# Patient Record
Sex: MALE | Race: BLACK OR AFRICAN AMERICAN | NOT HISPANIC OR LATINO | Employment: FULL TIME | URBAN - METROPOLITAN AREA
[De-identification: names, ages, dates, MRNs, and addresses within clinical notes are randomized per-mention and may not be internally consistent; named-entity substitution may affect disease eponyms.]

---

## 2020-05-20 ENCOUNTER — LAB VISIT (OUTPATIENT)
Dept: PRIMARY CARE CLINIC | Facility: CLINIC | Age: 45
End: 2020-05-20
Payer: COMMERCIAL

## 2020-05-20 DIAGNOSIS — R05.9 COUGH: Primary | ICD-10-CM

## 2020-05-20 PROCEDURE — U0003 INFECTIOUS AGENT DETECTION BY NUCLEIC ACID (DNA OR RNA); SEVERE ACUTE RESPIRATORY SYNDROME CORONAVIRUS 2 (SARS-COV-2) (CORONAVIRUS DISEASE [COVID-19]), AMPLIFIED PROBE TECHNIQUE, MAKING USE OF HIGH THROUGHPUT TECHNOLOGIES AS DESCRIBED BY CMS-2020-01-R: HCPCS

## 2020-05-21 LAB — SARS-COV-2 RNA RESP QL NAA+PROBE: NOT DETECTED

## 2020-08-03 ENCOUNTER — OFFICE VISIT (OUTPATIENT)
Dept: URGENT CARE | Facility: CLINIC | Age: 45
End: 2020-08-03
Payer: COMMERCIAL

## 2020-08-03 VITALS
HEART RATE: 91 BPM | WEIGHT: 200 LBS | RESPIRATION RATE: 18 BRPM | BODY MASS INDEX: 29.62 KG/M2 | TEMPERATURE: 99 F | SYSTOLIC BLOOD PRESSURE: 141 MMHG | DIASTOLIC BLOOD PRESSURE: 85 MMHG | HEIGHT: 69 IN | OXYGEN SATURATION: 96 %

## 2020-08-03 DIAGNOSIS — R07.89 CHEST TIGHTNESS: ICD-10-CM

## 2020-08-03 LAB — SARS-COV-2 RNA RESP QL NAA+PROBE: NOT DETECTED

## 2020-08-03 PROCEDURE — 99212 OFFICE O/P EST SF 10 MIN: CPT | Mod: S$GLB,,, | Performed by: INTERNAL MEDICINE

## 2020-08-03 PROCEDURE — U0003 INFECTIOUS AGENT DETECTION BY NUCLEIC ACID (DNA OR RNA); SEVERE ACUTE RESPIRATORY SYNDROME CORONAVIRUS 2 (SARS-COV-2) (CORONAVIRUS DISEASE [COVID-19]), AMPLIFIED PROBE TECHNIQUE, MAKING USE OF HIGH THROUGHPUT TECHNOLOGIES AS DESCRIBED BY CMS-2020-01-R: HCPCS

## 2020-08-03 PROCEDURE — 99212 PR OFFICE/OUTPT VISIT, EST, LEVL II, 10-19 MIN: ICD-10-PCS | Mod: S$GLB,,, | Performed by: INTERNAL MEDICINE

## 2020-08-03 RX ORDER — ERGOCALCIFEROL 1.25 MG/1
CAPSULE ORAL
COMMUNITY
Start: 2020-05-15 | End: 2023-02-08

## 2020-08-03 NOTE — PROGRESS NOTES
"Subjective:       Patient ID: Jason Coy is a 45 y.o. male.    Vitals:  height is 5' 9" (1.753 m) and weight is 90.7 kg (200 lb). His temperature is 98.5 °F (36.9 °C). His pulse is 91. His respiration is 18 and oxygen saturation is 96%.     Chief Complaint: Shortness of Breath (more like tightness )    Patient here today with c/o tightness in chest x's 2 days. Denies cough. Patient also stated his niece has tested Positive for COVID.      Shortness of Breath  Pertinent negatives include no chest pain, fever, headaches, leg swelling, rash, sore throat or vomiting.       Constitution: Negative for chills, fatigue and fever.   HENT: Negative for congestion and sore throat.    Neck: Negative for painful lymph nodes.   Cardiovascular: Negative for chest pain and leg swelling.   Eyes: Negative for double vision and blurred vision.   Respiratory: Positive for shortness of breath. Negative for cough.    Gastrointestinal: Negative for nausea, vomiting and diarrhea.   Genitourinary: Negative for dysuria, frequency and urgency.   Musculoskeletal: Negative for joint pain, joint swelling, muscle cramps and muscle ache.   Skin: Negative for color change, pale and rash.   Allergic/Immunologic: Negative for seasonal allergies.   Neurological: Negative for dizziness, history of vertigo, light-headedness, passing out and headaches.   Hematologic/Lymphatic: Negative for swollen lymph nodes, easy bruising/bleeding and history of blood clots. Does not bruise/bleed easily.   Psychiatric/Behavioral: Negative for nervous/anxious, sleep disturbance and depression. The patient is not nervous/anxious.        Objective:      Physical Exam   Constitutional: He is oriented to person, place, and time. He appears well-developed. He is cooperative.  Non-toxic appearance. He does not appear ill. No distress.   HENT:   Head: Normocephalic and atraumatic.   Ears:   Right Ear: Hearing, tympanic membrane, external ear and ear canal normal.   Left " Ear: Hearing, tympanic membrane, external ear and ear canal normal.   Nose: Nose normal. No mucosal edema, rhinorrhea or nasal deformity. No epistaxis. Right sinus exhibits no maxillary sinus tenderness and no frontal sinus tenderness. Left sinus exhibits no maxillary sinus tenderness and no frontal sinus tenderness.   Mouth/Throat: Uvula is midline, oropharynx is clear and moist and mucous membranes are normal. Mucous membranes are moist. No trismus in the jaw. Normal dentition. No uvula swelling. No oropharyngeal exudate, posterior oropharyngeal edema or posterior oropharyngeal erythema.   Eyes: Conjunctivae and lids are normal. No scleral icterus.   Neck: Trachea normal, full passive range of motion without pain and phonation normal. Neck supple. No neck rigidity. No edema and no erythema present.   Cardiovascular: Normal rate, regular rhythm, normal heart sounds and normal pulses.   Pulmonary/Chest: Effort normal and breath sounds normal. No stridor. No respiratory distress. He has no decreased breath sounds. He has no wheezes. He has no rhonchi. He has no rales. He exhibits no tenderness.   Abdominal: Normal appearance.   Musculoskeletal: Normal range of motion.         General: No deformity.   Neurological: He is alert and oriented to person, place, and time. He exhibits normal muscle tone. Coordination normal.   Skin: Skin is warm, dry, intact, not diaphoretic and not pale. Psychiatric: His speech is normal and behavior is normal. Judgment and thought content normal.   Nursing note and vitals reviewed.        Assessment:       1. Chest tightness        Plan:         Chest tightness  -     COVID-19 Routine Screening    Lung exam normal. Sp02 normal. Swabbed for COVID in clinic.

## 2020-08-03 NOTE — PATIENT INSTRUCTIONS
Instructions for Patients with Confirmed or Suspected COVID-19    If you are awaiting your test result, you will either be called or it will be released to the patient portal.  If you have any questions about your test, please visit [email or web address] or call our COVID-19 information line at [phone].      Instructions for non-hospitalized or discharged patients with confirmed or suspected COVID-19:       Stay home except to get medical care.    Separate yourself from other people and animals in your home.    Call ahead before visiting your doctor.    Wear a face mask.    Cover your coughs and sneezes.    Clean your hands often.    Avoid sharing personal household items.    Clean all high-touch surfaces every day.    Monitor your symptoms. Seek prompt medical attention if your illness is worsening (e.g., difficulty breathing). Before seeking care, call your healthcare provider.    If you have a medical emergency and must call 911, notify the dispatcher that you have or are being evaluated for COVID-19. If possible, put on a face mask before emergency medical services arrive.    Use the following symptom-based strategy to return to normal activity following a suspected or confirmed case of COVID-19. Continue isolation until:   o At least 3 days (72 hours) have passed since recovery defined as resolution of fever without the use of fever-reducing medications and improvement in respiratory symptoms (e.g. cough, shortness of breath), and   o At least 10 days have passed since the first positive test.       As one of the next steps, you will receive a call or text from the [place] Department of Health ([facility]) COVID-19 Tracing Team. See the contact information below so you know not to ignore the health departments call. It is important that you contact them back immediately so they can help.     Contact Tracer Number:  [phone]  Caller ID for most carriers: [place] [facility]    What is  contact tracing?   Contact tracing is a process that helps identify everyone who has been in close contact with an infected person. Contact tracers let those people know they may have been exposed and guide them on next steps. Confidentiality is important for everyone; no one will be told who may have exposed them to the virus.   Your involvement is important. The more we know about where and how this virus is spreading, the better chance we have at stopping it from spreading further.  What does exposure mean?   Exposure means you have been within 6 feet for more than 15 minutes with a person who has or had COVID-19.  What kind of questions do the contact tracers ask?   A contact tracer will confirm your basic contact information including name, address, phone number, and next of kin, as well as asking about any symptoms you may have had. Theyll also ask you how you think you may have gotten sick, such as places where you may have been exposed to the virus, and people you were with. Those names will never be shared with anyone outside of that call, and will only be used to help trace and stop the spread of the virus.   I have privacy concerns. How will the state use my information?   Your privacy about your health is important. All calls are completed using call centers that use the appropriate health privacy protection measures (HIPAA compliance), meaning that your patient information is safe. No one will ever ask you any questions related to immigration status. Your health comes first.   Do I have to participate?   You do not have to participate, but we strongly encourage you to. Contact tracing can help us catch and control new outbreaks as theyre developing to keep your friends and family safe.   What if I dont hear from anyone?   If you dont receive a call within 24 hours, you can call the number above right away to inquire about your status. That line is open from 8:00 am - 8:00 p.m., 7 days a  week.  Contact tracing saves lives! Together, we have the power to beat this virus and keep our loved ones and neighbors safe.       Instructions for household members, intimate partners and caregivers in a non-healthcare setting of a patient with confirmed or suspected COVID-19:         Close contacts should monitor their health and call their healthcare provider right away if they develop symptoms suggestive of COVID-19 (e.g., fever, cough, shortness of breath).    Stay home except to get medical care. Separate yourself from other people and animals in the home.   Monitor the patients symptoms. If the patient is getting sicker, call his or her healthcare provider. If the patient has a medical emergency and you need to call 911, notify the dispatch personnel that the patient has or is being evaluated for COVID-19.    Wear a facemask when around other people such as sharing a room or vehicle and before entering a healthcare provider's office.   Cover coughs and sneezes with a tissue. Throw used tissues in a lined trash can immediately and wash hands.   Clean hands often with soap and water for at least 20 seconds or with an alcohol-based hand , rubbing hands together until they feel dry. Avoid touching your eyes, nose, and mouth with unwashed hands.   Clean all high-touch; surfaces every day, including counters, tabletops, doorknobs, bathroom fixtures, toilets, phones, keyboards, tablets, bedside tables, etc. Use a household cleaning spray or wipe according to label instructions.   Avoid sharing personal household items such as dishes, drinking glasses, cups, towels, bedding, etc. After these items are used, they should be washed thoroughly with soap and water.   Continue isolation until:   At least 3 days (72 hours) have passed since recovery defined as resolution of fever without the use of fever-reducing medications and improvement in respiratory symptoms (e.g. cough, shortness of breath),  and    At least 10 days have passed since the patients first positive test.    https://www.cdc.gov/coronavirus/2019-ncov/your-health/index.htm

## 2021-03-31 ENCOUNTER — TELEPHONE (OUTPATIENT)
Dept: ENDOSCOPY | Facility: HOSPITAL | Age: 46
End: 2021-03-31

## 2021-04-26 ENCOUNTER — PATIENT MESSAGE (OUTPATIENT)
Dept: RESEARCH | Facility: HOSPITAL | Age: 46
End: 2021-04-26

## 2021-04-27 ENCOUNTER — TELEPHONE (OUTPATIENT)
Dept: ENDOSCOPY | Facility: HOSPITAL | Age: 46
End: 2021-04-27

## 2021-04-27 DIAGNOSIS — Z12.11 SCREEN FOR COLON CANCER: Primary | ICD-10-CM

## 2021-04-27 DIAGNOSIS — Z01.818 PRE-OP TESTING: Primary | ICD-10-CM

## 2021-04-27 RX ORDER — TRAZODONE HYDROCHLORIDE 50 MG/1
50 TABLET ORAL NIGHTLY
COMMUNITY
End: 2023-02-08

## 2021-04-27 RX ORDER — SODIUM, POTASSIUM,MAG SULFATES 17.5-3.13G
1 SOLUTION, RECONSTITUTED, ORAL ORAL DAILY
Qty: 1 KIT | Refills: 0 | Status: SHIPPED | OUTPATIENT
Start: 2021-04-27 | End: 2021-04-29

## 2021-05-23 ENCOUNTER — LAB VISIT (OUTPATIENT)
Dept: URGENT CARE | Facility: CLINIC | Age: 46
End: 2021-05-23
Payer: COMMERCIAL

## 2021-05-23 DIAGNOSIS — Z01.818 PRE-OP TESTING: ICD-10-CM

## 2021-05-23 LAB — SARS-COV-2 RNA RESP QL NAA+PROBE: NOT DETECTED

## 2021-05-23 PROCEDURE — U0003 INFECTIOUS AGENT DETECTION BY NUCLEIC ACID (DNA OR RNA); SEVERE ACUTE RESPIRATORY SYNDROME CORONAVIRUS 2 (SARS-COV-2) (CORONAVIRUS DISEASE [COVID-19]), AMPLIFIED PROBE TECHNIQUE, MAKING USE OF HIGH THROUGHPUT TECHNOLOGIES AS DESCRIBED BY CMS-2020-01-R: HCPCS | Performed by: FAMILY MEDICINE

## 2021-05-23 PROCEDURE — U0005 INFEC AGEN DETEC AMPLI PROBE: HCPCS | Performed by: FAMILY MEDICINE

## 2021-05-26 ENCOUNTER — ANESTHESIA EVENT (OUTPATIENT)
Dept: ENDOSCOPY | Facility: HOSPITAL | Age: 46
End: 2021-05-26
Payer: COMMERCIAL

## 2021-05-26 ENCOUNTER — HOSPITAL ENCOUNTER (OUTPATIENT)
Facility: HOSPITAL | Age: 46
Discharge: HOME | End: 2021-05-26
Attending: INTERNAL MEDICINE | Admitting: INTERNAL MEDICINE
Payer: COMMERCIAL

## 2021-05-26 ENCOUNTER — ANESTHESIA (OUTPATIENT)
Dept: ENDOSCOPY | Facility: HOSPITAL | Age: 46
End: 2021-05-26
Payer: COMMERCIAL

## 2021-05-26 VITALS
HEIGHT: 69 IN | HEART RATE: 60 BPM | WEIGHT: 200 LBS | BODY MASS INDEX: 29.62 KG/M2 | SYSTOLIC BLOOD PRESSURE: 118 MMHG | RESPIRATION RATE: 18 BRPM | OXYGEN SATURATION: 97 % | TEMPERATURE: 98 F | DIASTOLIC BLOOD PRESSURE: 74 MMHG

## 2021-05-26 DIAGNOSIS — Z12.11 SCREENING FOR COLON CANCER: ICD-10-CM

## 2021-05-26 PROCEDURE — E9220 PRA ENDO ANESTHESIA: ICD-10-PCS | Mod: 33,,, | Performed by: NURSE ANESTHETIST, CERTIFIED REGISTERED

## 2021-05-26 PROCEDURE — 63600175 PHARM REV CODE 636 W HCPCS: Performed by: NURSE ANESTHETIST, CERTIFIED REGISTERED

## 2021-05-26 PROCEDURE — 25000003 PHARM REV CODE 250: Performed by: INTERNAL MEDICINE

## 2021-05-26 PROCEDURE — 27201012 HC FORCEPS, HOT/COLD, DISP: Performed by: INTERNAL MEDICINE

## 2021-05-26 PROCEDURE — 88305 TISSUE EXAM BY PATHOLOGIST: ICD-10-PCS | Mod: 26,,, | Performed by: PATHOLOGY

## 2021-05-26 PROCEDURE — E9220 PRA ENDO ANESTHESIA: HCPCS | Mod: 33,,, | Performed by: NURSE ANESTHETIST, CERTIFIED REGISTERED

## 2021-05-26 PROCEDURE — 45380 PR COLONOSCOPY,BIOPSY: ICD-10-PCS | Mod: 33,,, | Performed by: INTERNAL MEDICINE

## 2021-05-26 PROCEDURE — 45380 COLONOSCOPY AND BIOPSY: CPT | Performed by: INTERNAL MEDICINE

## 2021-05-26 PROCEDURE — 37000008 HC ANESTHESIA 1ST 15 MINUTES: Performed by: INTERNAL MEDICINE

## 2021-05-26 PROCEDURE — 45380 COLONOSCOPY AND BIOPSY: CPT | Mod: 33,,, | Performed by: INTERNAL MEDICINE

## 2021-05-26 PROCEDURE — 88305 TISSUE EXAM BY PATHOLOGIST: CPT | Performed by: PATHOLOGY

## 2021-05-26 PROCEDURE — 88305 TISSUE EXAM BY PATHOLOGIST: CPT | Mod: 26,,, | Performed by: PATHOLOGY

## 2021-05-26 PROCEDURE — 37000009 HC ANESTHESIA EA ADD 15 MINS: Performed by: INTERNAL MEDICINE

## 2021-05-26 RX ORDER — PROPOFOL 10 MG/ML
VIAL (ML) INTRAVENOUS
Status: DISCONTINUED | OUTPATIENT
Start: 2021-05-26 | End: 2021-05-26

## 2021-05-26 RX ORDER — PROPOFOL 10 MG/ML
VIAL (ML) INTRAVENOUS CONTINUOUS PRN
Status: DISCONTINUED | OUTPATIENT
Start: 2021-05-26 | End: 2021-05-26

## 2021-05-26 RX ORDER — LIDOCAINE HCL/PF 100 MG/5ML
SYRINGE (ML) INTRAVENOUS
Status: DISCONTINUED | OUTPATIENT
Start: 2021-05-26 | End: 2021-05-26

## 2021-05-26 RX ORDER — SODIUM CHLORIDE 9 MG/ML
INJECTION, SOLUTION INTRAVENOUS CONTINUOUS
Status: DISCONTINUED | OUTPATIENT
Start: 2021-05-26 | End: 2021-05-26 | Stop reason: HOSPADM

## 2021-05-26 RX ADMIN — SODIUM CHLORIDE: 0.9 INJECTION, SOLUTION INTRAVENOUS at 10:05

## 2021-05-26 RX ADMIN — Medication 100 MG: at 10:05

## 2021-05-26 RX ADMIN — PROPOFOL 200 MCG/KG/MIN: 10 INJECTION, EMULSION INTRAVENOUS at 10:05

## 2021-05-26 RX ADMIN — PROPOFOL 80 MG: 10 INJECTION, EMULSION INTRAVENOUS at 10:05

## 2021-06-02 ENCOUNTER — PATIENT MESSAGE (OUTPATIENT)
Dept: GASTROENTEROLOGY | Facility: HOSPITAL | Age: 46
End: 2021-06-02

## 2021-06-02 LAB
FINAL PATHOLOGIC DIAGNOSIS: NORMAL
GROSS: NORMAL
Lab: NORMAL

## 2023-02-07 ENCOUNTER — HOSPITAL ENCOUNTER (OUTPATIENT)
Facility: HOSPITAL | Age: 48
Discharge: HOME | End: 2023-02-09
Attending: EMERGENCY MEDICINE | Admitting: INTERNAL MEDICINE
Payer: COMMERCIAL

## 2023-02-07 DIAGNOSIS — K62.5 RECTAL BLEEDING: Primary | ICD-10-CM

## 2023-02-07 DIAGNOSIS — K57.92 DIVERTICULITIS: ICD-10-CM

## 2023-02-07 DIAGNOSIS — A56.3 CHLAMYDIAL PROCTITIS: ICD-10-CM

## 2023-02-07 DIAGNOSIS — B20 HIV DISEASE: ICD-10-CM

## 2023-02-07 DIAGNOSIS — K62.89 PROCTITIS: ICD-10-CM

## 2023-02-07 LAB
ABO + RH BLD: NORMAL
ALBUMIN SERPL BCP-MCNC: 3.5 G/DL (ref 3.5–5.2)
ALP SERPL-CCNC: 73 U/L (ref 55–135)
ALT SERPL W/O P-5'-P-CCNC: 10 U/L (ref 10–44)
ANION GAP SERPL CALC-SCNC: 13 MMOL/L (ref 8–16)
AST SERPL-CCNC: 19 U/L (ref 10–40)
BASOPHILS # BLD AUTO: 0.04 K/UL (ref 0–0.2)
BASOPHILS NFR BLD: 0.6 % (ref 0–1.9)
BILIRUB SERPL-MCNC: 0.5 MG/DL (ref 0.1–1)
BILIRUB UR QL STRIP: NEGATIVE
BLD GP AB SCN CELLS X3 SERPL QL: NORMAL
BUN SERPL-MCNC: 12 MG/DL (ref 6–20)
CALCIUM SERPL-MCNC: 9.3 MG/DL (ref 8.7–10.5)
CHLORIDE SERPL-SCNC: 104 MMOL/L (ref 95–110)
CLARITY UR REFRACT.AUTO: CLEAR
CO2 SERPL-SCNC: 23 MMOL/L (ref 23–29)
COLOR UR AUTO: YELLOW
CREAT SERPL-MCNC: 1.5 MG/DL (ref 0.5–1.4)
DIFFERENTIAL METHOD: ABNORMAL
EOSINOPHIL # BLD AUTO: 0 K/UL (ref 0–0.5)
EOSINOPHIL NFR BLD: 0 % (ref 0–8)
ERYTHROCYTE [DISTWIDTH] IN BLOOD BY AUTOMATED COUNT: 14.5 % (ref 11.5–14.5)
EST. GFR  (NO RACE VARIABLE): 57.8 ML/MIN/1.73 M^2
GLUCOSE SERPL-MCNC: 89 MG/DL (ref 70–110)
GLUCOSE UR QL STRIP: NEGATIVE
HCT VFR BLD AUTO: 39.3 % (ref 40–54)
HCV AB SERPL QL IA: REACTIVE
HGB BLD-MCNC: 12.3 G/DL (ref 14–18)
HGB UR QL STRIP: ABNORMAL
IMM GRANULOCYTES # BLD AUTO: 0.01 K/UL (ref 0–0.04)
IMM GRANULOCYTES NFR BLD AUTO: 0.2 % (ref 0–0.5)
INFLUENZA A, MOLECULAR: NOT DETECTED
INFLUENZA B, MOLECULAR: NOT DETECTED
KETONES UR QL STRIP: ABNORMAL
LACTATE SERPL-SCNC: 0.6 MMOL/L (ref 0.5–2.2)
LACTATE SERPL-SCNC: 0.7 MMOL/L (ref 0.5–2.2)
LEUKOCYTE ESTERASE UR QL STRIP: NEGATIVE
LYMPHOCYTES # BLD AUTO: 3 K/UL (ref 1–4.8)
LYMPHOCYTES NFR BLD: 46.3 % (ref 18–48)
MCH RBC QN AUTO: 25.9 PG (ref 27–31)
MCHC RBC AUTO-ENTMCNC: 31.3 G/DL (ref 32–36)
MCV RBC AUTO: 83 FL (ref 82–98)
MONOCYTES # BLD AUTO: 0.6 K/UL (ref 0.3–1)
MONOCYTES NFR BLD: 9.2 % (ref 4–15)
NEUTROPHILS # BLD AUTO: 2.8 K/UL (ref 1.8–7.7)
NEUTROPHILS NFR BLD: 43.7 % (ref 38–73)
NITRITE UR QL STRIP: NEGATIVE
NRBC BLD-RTO: 0 /100 WBC
PH UR STRIP: 6 [PH] (ref 5–8)
PLATELET # BLD AUTO: 354 K/UL (ref 150–450)
PMV BLD AUTO: 11 FL (ref 9.2–12.9)
POTASSIUM SERPL-SCNC: 4 MMOL/L (ref 3.5–5.1)
PROT SERPL-MCNC: 9 G/DL (ref 6–8.4)
PROT UR QL STRIP: ABNORMAL
RBC # BLD AUTO: 4.74 M/UL (ref 4.6–6.2)
RSV AG BY MOLECULAR METHOD: NOT DETECTED
SARS-COV-2 RNA RESP QL NAA+PROBE: NOT DETECTED
SODIUM SERPL-SCNC: 140 MMOL/L (ref 136–145)
SP GR UR STRIP: >1.03 (ref 1–1.03)
URN SPEC COLLECT METH UR: ABNORMAL
WBC # BLD AUTO: 6.41 K/UL (ref 3.9–12.7)

## 2023-02-07 PROCEDURE — 25000003 PHARM REV CODE 250: Performed by: EMERGENCY MEDICINE

## 2023-02-07 PROCEDURE — G0378 HOSPITAL OBSERVATION PER HR: HCPCS

## 2023-02-07 PROCEDURE — 87491 CHLMYD TRACH DNA AMP PROBE: CPT | Performed by: EMERGENCY MEDICINE

## 2023-02-07 PROCEDURE — 99285 EMERGENCY DEPT VISIT HI MDM: CPT | Mod: CS,,, | Performed by: EMERGENCY MEDICINE

## 2023-02-07 PROCEDURE — 25000003 PHARM REV CODE 250: Performed by: PHYSICIAN ASSISTANT

## 2023-02-07 PROCEDURE — 80053 COMPREHEN METABOLIC PANEL: CPT | Performed by: EMERGENCY MEDICINE

## 2023-02-07 PROCEDURE — 99285 EMERGENCY DEPT VISIT HI MDM: CPT | Mod: 25

## 2023-02-07 PROCEDURE — 96365 THER/PROPH/DIAG IV INF INIT: CPT | Mod: 59

## 2023-02-07 PROCEDURE — 86803 HEPATITIS C AB TEST: CPT | Performed by: PHYSICIAN ASSISTANT

## 2023-02-07 PROCEDURE — 0241U SARS-COV2 (COVID) WITH FLU/RSV BY PCR: CPT | Performed by: EMERGENCY MEDICINE

## 2023-02-07 PROCEDURE — 81003 URINALYSIS AUTO W/O SCOPE: CPT | Performed by: PHYSICIAN ASSISTANT

## 2023-02-07 PROCEDURE — 85025 COMPLETE CBC W/AUTO DIFF WBC: CPT | Performed by: EMERGENCY MEDICINE

## 2023-02-07 PROCEDURE — 96360 HYDRATION IV INFUSION INIT: CPT | Mod: 59

## 2023-02-07 PROCEDURE — 99285 PR EMERGENCY DEPT VISIT,LEVEL V: ICD-10-PCS | Mod: CS,,, | Performed by: EMERGENCY MEDICINE

## 2023-02-07 PROCEDURE — 63600175 PHARM REV CODE 636 W HCPCS: Performed by: EMERGENCY MEDICINE

## 2023-02-07 PROCEDURE — 87522 HEPATITIS C REVRS TRNSCRPJ: CPT | Performed by: EMERGENCY MEDICINE

## 2023-02-07 PROCEDURE — 96361 HYDRATE IV INFUSION ADD-ON: CPT

## 2023-02-07 PROCEDURE — 87040 BLOOD CULTURE FOR BACTERIA: CPT | Performed by: EMERGENCY MEDICINE

## 2023-02-07 PROCEDURE — 96375 TX/PRO/DX INJ NEW DRUG ADDON: CPT | Mod: 59

## 2023-02-07 PROCEDURE — 83605 ASSAY OF LACTIC ACID: CPT | Performed by: EMERGENCY MEDICINE

## 2023-02-07 PROCEDURE — 86900 BLOOD TYPING SEROLOGIC ABO: CPT | Performed by: EMERGENCY MEDICINE

## 2023-02-07 PROCEDURE — 25500020 PHARM REV CODE 255: Performed by: EMERGENCY MEDICINE

## 2023-02-07 RX ORDER — ACETAMINOPHEN 325 MG/1
650 TABLET ORAL
Status: COMPLETED | OUTPATIENT
Start: 2023-02-07 | End: 2023-02-07

## 2023-02-07 RX ORDER — PROCHLORPERAZINE EDISYLATE 5 MG/ML
5 INJECTION INTRAMUSCULAR; INTRAVENOUS EVERY 6 HOURS PRN
Status: DISCONTINUED | OUTPATIENT
Start: 2023-02-08 | End: 2023-02-09 | Stop reason: HOSPADM

## 2023-02-07 RX ORDER — NALOXONE HCL 0.4 MG/ML
0.02 VIAL (ML) INJECTION
Status: DISCONTINUED | OUTPATIENT
Start: 2023-02-08 | End: 2023-02-09 | Stop reason: HOSPADM

## 2023-02-07 RX ORDER — ONDANSETRON 2 MG/ML
4 INJECTION INTRAMUSCULAR; INTRAVENOUS EVERY 8 HOURS PRN
Status: DISCONTINUED | OUTPATIENT
Start: 2023-02-08 | End: 2023-02-09 | Stop reason: HOSPADM

## 2023-02-07 RX ORDER — MORPHINE SULFATE 4 MG/ML
4 INJECTION, SOLUTION INTRAMUSCULAR; INTRAVENOUS
Status: COMPLETED | OUTPATIENT
Start: 2023-02-07 | End: 2023-02-07

## 2023-02-07 RX ORDER — IBUPROFEN 200 MG
16 TABLET ORAL
Status: DISCONTINUED | OUTPATIENT
Start: 2023-02-08 | End: 2023-02-09 | Stop reason: HOSPADM

## 2023-02-07 RX ORDER — IPRATROPIUM BROMIDE AND ALBUTEROL SULFATE 2.5; .5 MG/3ML; MG/3ML
3 SOLUTION RESPIRATORY (INHALATION) EVERY 6 HOURS PRN
Status: DISCONTINUED | OUTPATIENT
Start: 2023-02-08 | End: 2023-02-09 | Stop reason: HOSPADM

## 2023-02-07 RX ORDER — SODIUM CHLORIDE 9 MG/ML
1000 INJECTION, SOLUTION INTRAVENOUS
Status: COMPLETED | OUTPATIENT
Start: 2023-02-07 | End: 2023-02-07

## 2023-02-07 RX ORDER — IBUPROFEN 200 MG
24 TABLET ORAL
Status: DISCONTINUED | OUTPATIENT
Start: 2023-02-08 | End: 2023-02-09 | Stop reason: HOSPADM

## 2023-02-07 RX ORDER — SODIUM CHLORIDE 9 MG/ML
INJECTION, SOLUTION INTRAVENOUS CONTINUOUS
Status: ACTIVE | OUTPATIENT
Start: 2023-02-08 | End: 2023-02-09

## 2023-02-07 RX ORDER — SODIUM CHLORIDE 0.9 % (FLUSH) 0.9 %
10 SYRINGE (ML) INJECTION
Status: DISCONTINUED | OUTPATIENT
Start: 2023-02-08 | End: 2023-02-09 | Stop reason: HOSPADM

## 2023-02-07 RX ORDER — ACETAMINOPHEN 325 MG/1
650 TABLET ORAL EVERY 4 HOURS PRN
Status: DISCONTINUED | OUTPATIENT
Start: 2023-02-08 | End: 2023-02-09 | Stop reason: HOSPADM

## 2023-02-07 RX ORDER — TALC
6 POWDER (GRAM) TOPICAL NIGHTLY PRN
Status: DISCONTINUED | OUTPATIENT
Start: 2023-02-08 | End: 2023-02-09 | Stop reason: HOSPADM

## 2023-02-07 RX ADMIN — SODIUM CHLORIDE, POTASSIUM CHLORIDE, SODIUM LACTATE AND CALCIUM CHLORIDE 1000 ML: 600; 310; 30; 20 INJECTION, SOLUTION INTRAVENOUS at 05:02

## 2023-02-07 RX ADMIN — MORPHINE SULFATE 4 MG: 4 INJECTION INTRAVENOUS at 09:02

## 2023-02-07 RX ADMIN — PIPERACILLIN SODIUM AND TAZOBACTAM SODIUM 4.5 G: 4; .5 INJECTION, POWDER, FOR SOLUTION INTRAVENOUS at 09:02

## 2023-02-07 RX ADMIN — SODIUM CHLORIDE 1000 ML: 0.9 INJECTION, SOLUTION INTRAVENOUS at 03:02

## 2023-02-07 RX ADMIN — IOHEXOL 100 ML: 350 INJECTION, SOLUTION INTRAVENOUS at 08:02

## 2023-02-07 RX ADMIN — ACETAMINOPHEN 650 MG: 325 TABLET ORAL at 03:02

## 2023-02-07 NOTE — ED TRIAGE NOTES
"Pt arriving to ED with c/o blood in stool.Passing bright red blood for 2 weeks, started with blood in urine. States no longer passing blood in urine. Also states hasn't had a full meal in two weeks because "every time I eat, I feel like I have to go to the bathroom and nothing comes out so it is just painful". States abdominal discomfort, but no pain. C/o pain in rectum.   "

## 2023-02-07 NOTE — FIRST PROVIDER EVALUATION
" Emergency Department TeleTriage Encounter Note      CHIEF COMPLAINT    Chief Complaint   Patient presents with    Rectal Bleeding     Passing bright red blood for 2 weeks, started with blood in urine       VITAL SIGNS   Initial Vitals [02/07/23 1429]   BP Pulse Resp Temp SpO2   125/76 104 18 (!) 100.4 °F (38 °C) 98 %      MAP       --            ALLERGIES    Review of patient's allergies indicates:   Allergen Reactions    Sulfamethoxazole-trimethoprim Other (See Comments)     "Headaches and sickness"  "Headaches and sickness"         PROVIDER TRIAGE NOTE  Patient presents with severe rectal pain, lower abdominal pain, bright red blood per rectum x2 weeks. Also reports hematuria 2 weeks ago for 2 days but that resolved.       ORDERS  Labs Reviewed   HEPATITIS C ANTIBODY   CBC W/ AUTO DIFFERENTIAL   COMPREHENSIVE METABOLIC PANEL   TYPE & SCREEN       ED Orders (720h ago, onward)      Start Ordered     Status Ordering Provider    02/07/23 1512 02/07/23 1511  Saline lock IV (18 ga. or larger)  Once         Ordered ERIKA DEUTSCH    02/07/23 1512 02/07/23 1511  2nd Saline lock IV (18 ga. or larger)  Once         Ordered ERIKA DEUTSCH    02/07/23 1512 02/07/23 1511  NPO (Ice Chips)  Once         Ordered ERIKA DEUTSCH    02/07/23 1512 02/07/23 1511  CBC auto differential  STAT         Ordered ERIKA DEUTSCH    02/07/23 1512 02/07/23 1511  Comprehensive metabolic panel  STAT         Ordered ERIKA DEUTSCH    02/07/23 1512 02/07/23 1511  Type & Screen  STAT         Ordered ERIKA DEUTSCH    02/07/23 1512 02/07/23 1511  Vital signs  Once         Ordered ERIKA DEUTSCH    02/07/23 1512 02/07/23 1511  Cardiac Monitoring - Adult  Continuous        Comments: Notify Physician If:    Ordered ERIKA DEUTSCH    02/07/23 1512 02/07/23 1511  Pulse Oximetry Continuous  Continuous         Ordered ERIKA DEUTSCH    02/07/23 1431 02/07/23 1431  Hepatitis C Antibody  STAT         Ordered " CECIL CARBAJAL              Virtual Visit Note: The provider triage portion of this emergency department evaluation and documentation was performed via [employer], a HIPAA-compliant telemedicine application, in concert with a tele-presenter in the room. A face to face patient evaluation with one of my colleagues will occur once the patient is placed in an emergency department room.      DISCLAIMER: This note was prepared with [employer] voice recognition transcription software. Garbled syntax, mangled pronouns, and other bizarre constructions may be attributed to that software system.

## 2023-02-07 NOTE — ED PROVIDER NOTES
"Encounter Date: 2/7/2023       History     Chief Complaint   Patient presents with    Rectal Bleeding     Passing bright red blood for 2 weeks, started with blood in urine     48 yo male presenting with rectal bleeding and pain.    PMH: HIV on AART, paresthesias, syphilis   No anticoagulation     Context:  The patient reports symptoms for 2 weeks.  He reports rectal discomfort and is having difficulty passing stool.  He has had multiple episodes of bright red blood (has pictures on his phone).  He went to his primary care doctor and was recently tested for syphilis, GC/CT last week, results were all negative today and he was instructed to present to the ED.  He reports some lower abdominal discomfort and has had difficulty eating due to pain.  He had some hematuria when his symptoms began a week ago  Onset:  Gradual   Location:  Left lower quadrant   Duration:  2 weeks   Associated Symptoms: denies vomiting         The history is provided by the patient and medical records. No  was used.   Review of patient's allergies indicates:   Allergen Reactions    Sulfamethoxazole-trimethoprim Other (See Comments)     "Headaches and sickness"  "Headaches and sickness"       Past Medical History:   Diagnosis Date    HIV infection      Past Surgical History:   Procedure Laterality Date    COLONOSCOPY N/A 5/26/2021    Procedure: COLONOSCOPY;  Surgeon: Kaleb Magdaleno MD;  Location: [address]);  Service: Endoscopy;  Laterality: N/A;  outside referral from [facility] care for Colonoscopy see media tab records -   cOVID test on 5/23/21 at [facility] -     JOINT REPLACEMENT      ankle surgery(L)     History reviewed. No pertinent family history.  Social History     Tobacco Use    Smoking status: Never    Smokeless tobacco: Never   Substance Use Topics    Alcohol use: Yes     Comment: socially     Drug use: Never     Review of Systems   Constitutional:  Positive for fever.   Gastrointestinal:  " Positive for abdominal pain and anal bleeding. Negative for vomiting.   Genitourinary:  Negative for dysuria and testicular pain.     Physical Exam     Initial Vitals [02/07/23 1429]   BP Pulse Resp Temp SpO2   125/76 104 18 (!) 100.4 °F (38 °C) 98 %      MAP       --         Physical Exam    Nursing note and vitals reviewed.  Constitutional: He is not diaphoretic. No distress.   HENT:   Head: Normocephalic and atraumatic.   Eyes: Right eye exhibits no discharge. Left eye exhibits no discharge.   Neck: Neck supple. No tracheal deviation present.   Cardiovascular:  Normal rate and regular rhythm.           Abdominal: Abdomen is soft. Bowel sounds are normal. He exhibits no distension. There is abdominal tenderness (LLQ).   Genitourinary: Rectum:      External hemorrhoid present.      No anal fissure, tenderness or internal hemorrhoid.      Genitourinary Comments: Pt's RN Chaperone present at bedside      Musculoskeletal:      Cervical back: Neck supple.     Lymphadenopathy: No inguinal adenopathy noted on the right or left side.   Neurological: He is alert and oriented to person, place, and time.   Skin: Skin is warm. No rash noted.   Psychiatric: He has a normal mood and affect. His behavior is normal.       ED Course   Procedures  Labs Reviewed   HEPATITIS C ANTIBODY - Abnormal; Notable for the following components:       Result Value    Hepatitis C Ab Reactive (*)     All other components within normal limits    Narrative:     Release to patient->Immediate   CBC W/ AUTO DIFFERENTIAL - Abnormal; Notable for the following components:    Hemoglobin 12.3 (*)     Hematocrit 39.3 (*)     MCH 25.9 (*)     MCHC 31.3 (*)     All other components within normal limits   COMPREHENSIVE METABOLIC PANEL - Abnormal; Notable for the following components:    Creatinine 1.5 (*)     Total Protein 9.0 (*)     eGFR 57.8 (*)     All other components within normal limits   URINALYSIS, REFLEX TO URINE CULTURE - Abnormal; Notable for the  following components:    Specific Gravity, UA >1.030 (*)     Protein, UA Trace (*)     Ketones, UA 1+ (*)     Occult Blood UA Trace (*)     All other components within normal limits    Narrative:     Specimen Source->Urine   COMPREHENSIVE METABOLIC PANEL - Abnormal; Notable for the following components:    CO2 20 (*)     Calcium 8.5 (*)     Albumin 2.8 (*)     ALT 8 (*)     All other components within normal limits    Narrative:     Release to patient->Immediate   CBC WITHOUT DIFFERENTIAL - Abnormal; Notable for the following components:    RBC 4.06 (*)     Hemoglobin 10.4 (*)     Hematocrit 33.8 (*)     MCH 25.6 (*)     MCHC 30.8 (*)     RDW 14.6 (*)     All other components within normal limits    Narrative:     Release to patient->Immediate   CULTURE, BLOOD    Narrative:     Aerobic and anaerobic   CULTURE, BLOOD    Narrative:     Aerobic and anaerobic   CULTURE, STOOL   T-HELPER CELLS (CD4) COUNT   LACTIC ACID, PLASMA   LACTIC ACID, PLASMA   SARS-COV2 (COVID) WITH FLU/RSV BY PCR   C.TRACH/N.GONOR AMP RNA, VARIES   CK   T-HELPER CELLS (CD4) COUNT   HEPATITIS C RNA, QUANTITATIVE, PCR   HELPER-SUPPRESSOR RATIO   RPR   CK   TYPE & SCREEN          Imaging Results               CTA Acute GI Isabel, Abdomen and Pelvis (Final result)  Result time 02/07/23 20:46:57      Final result by Erick Mendoza MD (02/07/23 20:46:57)                   Impression:      1. Circumferential wall thickening of the rectum with mild adjacent inflammatory stranding.  The findings suggest proctitis.  Probable mild acute diverticulitis of the distal sigmoid colon also.  Underlying neoplasm of the rectum is not completely excluded.  See above comments.  Recommend follow-up.  2. Incomplete distension of the urinary bladder with possible mild wall thickening.  Cystitis is a consideration.  3. Multiple small air-filled cystic foci at the lung bases, possibly associated with emphysematous changes or mild lymphangiomyomatosis.  4. Bilateral renal  cysts.  5. Constipation.  6.  This report was flagged in Epic as abnormal.      Electronically signed by: Erick Bran  Date:    02/07/2023  Time:    20:46               Narrative:    EXAMINATION:  CTA ACUTE GI BLEED, ABDOMEN AND PELVIS    CLINICAL HISTORY:  Multiple episodes of BRBPR, tenesmus, LLQ pain;    TECHNIQUE:  Low dose axial images, sagittal and coronal reformations were obtained from the lung bases to the pubic symphysis with and without the IV administration of 100 mL of Omnipaque 350 .  CTA protocol.  Precontrast, arterial, portal venous phases are submitted.    COMPARISON:  None.    FINDINGS:  Abdomen:    - Lower thorax:    - Lung bases: No infiltrates and no nodules.  Multiple small air-filled cystic foci are seen at the lung bases possibly associated with emphysematous changes or mild lymphangioleiomyomatosis    - Liver: No focal mass.    - Gallbladder: No calcified gallstones.    - Bile Ducts: No evidence of intra or extra hepatic biliary ductal dilation.    - Spleen: Negative.    - Kidneys: Few bilateral renal cysts.  No stone, soft tissue mass or hydronephrosis bilaterally.    - Adrenals: Unremarkable.    - Pancreas: No mass or peripancreatic fat stranding.    - Retroperitoneum:  No significant adenopathy.    - Vascular: No abdominal aortic aneurysm.    - Abdominal wall:  Unremarkable.    The appendix is within normal limits.    Pelvis:    Urinary bladder is incompletely distended with possible mild wall thickening.    Bowel/Mesentery:    There is circumferential wall thickening noted to the rectum.  There is mild adjacent inflammatory stranding.  Findings suggest proctitis.  Underlying neoplasm of the rectum is not excluded.  Recommend follow-up to resolution.  Endoscopy may be helpful also.    Possible minimal acute diverticulitis of the sigmoid colon also.    Retained feces throughout most of the colon most prominent in the proximal colon.    Bones:  No acute osseous abnormality and no  suspicious lytic or blastic lesion.    Soft tissue calcification adjacent to the left ischium may be associated with prior adjacent hamstring injury.    No evidence of acute contrast extravasation in the GI tract.                                       Medications   sodium chloride 0.9% flush 10 mL (has no administration in time range)   albuterol-ipratropium 2.5 mg-0.5 mg/3 mL nebulizer solution 3 mL (has no administration in time range)   melatonin tablet 6 mg (has no administration in time range)   ondansetron injection 4 mg (has no administration in time range)   prochlorperazine injection Soln 5 mg (has no administration in time range)   acetaminophen tablet 650 mg (has no administration in time range)   naloxone 0.4 mg/mL injection 0.02 mg (has no administration in time range)   glucose chewable tablet 16 g (has no administration in time range)   glucose chewable tablet 24 g (has no administration in time range)   0.9%  NaCl infusion ( Intravenous New Bag 2/8/23 0059)   oxyCODONE immediate release tablet 5 mg (5 mg Oral Given 2/8/23 0639)   doxycycline tablet 100 mg (100 mg Oral Given 2/8/23 0809)   mrbtegahp-mvuuwdvu-nmifiwh ala -25 mg (25 kg or greater) 1 tablet (has no administration in time range)   acetaminophen tablet 650 mg (650 mg Oral Given 2/7/23 1529)   0.9%  NaCl infusion (0 mLs Intravenous Stopped 2/7/23 1641)   lactated ringers bolus 1,000 mL (0 mLs Intravenous Stopped 2/7/23 1850)   iohexoL (OMNIPAQUE 350) injection 100 mL (100 mLs Intravenous Given 2/7/23 2000)   morphine injection 4 mg (4 mg Intravenous Given 2/7/23 2107)   piperacillin-tazobactam (ZOSYN) 4.5 g in dextrose 5 % in water (D5W) 5 % 100 mL IVPB (MB+) (0 g Intravenous Stopped 2/7/23 2139)   cefTRIAXone injection 0.5 g (0.5 g Intramuscular Given 2/8/23 0102)   LIDOcaine HCL 10 mg/ml (1%) injection 2.1 mL (2.1 mLs Intramuscular Given 2/8/23 0102)     Medical Decision Making:   History:   Old Medical Records: I decided to obtain  "old medical records.  Old Records Summarized: other records.       <> Summary of Records: Recent clinic results not visible on record review    Prior colonoscopy:  - Perianal skin tags found on perianal exam.                          - The distal rectum and anal verge are normal on                          retroflexion view.                          - One 2 mm polyp in the sigmoid colon, removed                          with a jumbo cold forceps. Resected and retrieved.   Initial Assessment:   Emergent evaluation of a 47-year-old male presenting with bright red blood per rectum, rectal pain, abdominal pain.  Arrives febrile and tachycardic, meeting 2 SIRS criteria. Non-peritonitic abdomen.   Reviewed orders by tele triage provider.  Differential Diagnosis:   Including, but not limited to:    Internal hemorrhoids  Proctitis  Lymphogranuloma venereum  Diverticulitis   Anemia   Clinical Tests:   Lab Tests: Ordered and Reviewed       <> Summary of Lab: No leukocytosis  Stable anemia    Radiological Study: Ordered and Reviewed  Sepsis Perfusion Assessment: "I attest a sepsis perfusion exam was performed within 6 hours of sepsis, severe sepsis, or septic shock presentation, following fluid resuscitation."  ED Management:  CTA with likely proctitis, possible mild diverticulitis, no active GI bleed.  Discussed retesting for GC/CT via rectal swab with patient - he is amenable, but will hold on empiric treatment given recent neg testing.  Case discussed with CRS -- recommend GI consult in the morning and consult placed.  No lactate elevation or hypotension to suggest septic shock or warrant 30 cc/kilo bolus.  Case discussed with Hospital Medicine - plan for GI consult, trend hemoglobin, continue IV abx, f/u rectal swab, HM to add on CPK level.  Patient understands and agrees with the plan for admission.    ED Diagnoses:  GI bleeding  Acute diverticulitis  Acute proctitis    Other:   I have discussed this case with another " health care provider.           ED Course as of 02/08/23 0838   Tue Feb 07, 2023   1647 WBC: 6.41  No leukocytosis  [AB]   1647 Hemoglobin(!): 12.3  Stable anemia  [AB]   1647 Creatinine(!): 1.5  Most recent 1.1 [AB]   1648 Temp(!): 100.4 °F (38 °C)  Fever  [AB]   1648 Pulse: 104  Tachycardia  [AB]   1856 Lactate, Ethan: 0.7  Normal lactate  [AB]   1943 SARS-CoV2 (COVID-19) Qualitative PCR: Not Detected  Viral swabs negative  [AB]   2053 Leukocytes, UA: Negative [AB]   2053 NITRITE UA: Negative  No UTI  [AB]      ED Course User Index  [AB] Ulisses De Luna MD                   Clinical Impression:   Final diagnoses:  [K62.5] Rectal bleeding (Primary)  [K62.89] Proctitis  [K57.92] Diverticulitis        ED Disposition Condition    Observation Stable                Ulisses De Luna MD  02/08/23 0838

## 2023-02-08 PROBLEM — B20 HIV DISEASE: Status: ACTIVE | Noted: 2023-02-08

## 2023-02-08 PROBLEM — A53.9 SYPHILIS: Status: ACTIVE | Noted: 2023-02-08

## 2023-02-08 PROBLEM — N17.9 AKI (ACUTE KIDNEY INJURY): Status: ACTIVE | Noted: 2023-02-08

## 2023-02-08 PROBLEM — K62.89 PROCTITIS: Status: ACTIVE | Noted: 2023-02-08

## 2023-02-08 PROBLEM — B20 HIV DISEASE: Chronic | Status: ACTIVE | Noted: 2023-02-08

## 2023-02-08 PROBLEM — K57.92 DIVERTICULITIS: Status: ACTIVE | Noted: 2023-02-08

## 2023-02-08 PROBLEM — A56.3 CHLAMYDIAL PROCTITIS: Status: ACTIVE | Noted: 2023-02-08

## 2023-02-08 LAB
ABSOLUTE CD3: 2041 CELLS/UL (ref 700–2100)
ABSOLUTE CD8: 1433 CELLS/UL (ref 200–900)
ALBUMIN SERPL BCP-MCNC: 2.8 G/DL (ref 3.5–5.2)
ALP SERPL-CCNC: 59 U/L (ref 55–135)
ALT SERPL W/O P-5'-P-CCNC: 8 U/L (ref 10–44)
ANION GAP SERPL CALC-SCNC: 10 MMOL/L (ref 8–16)
AST SERPL-CCNC: 15 U/L (ref 10–40)
BILIRUB SERPL-MCNC: 0.5 MG/DL (ref 0.1–1)
BUN SERPL-MCNC: 10 MG/DL (ref 6–20)
CALCIUM SERPL-MCNC: 8.5 MG/DL (ref 8.7–10.5)
CD3%: 87.2 % (ref 55–83)
CD3+CD4+ CELLS # BLD: 553 CELLS/UL (ref 300–1400)
CD3+CD4+ CELLS NFR BLD: 23.6 % (ref 28–57)
CD4/CD8 RATIO: 0.39 (ref 0.9–3.6)
CD8 % SUPPRESSOR T CELL: 61.2 % (ref 10–39)
CHLORIDE SERPL-SCNC: 106 MMOL/L (ref 95–110)
CK SERPL-CCNC: 240 U/L (ref 20–200)
CO2 SERPL-SCNC: 20 MMOL/L (ref 23–29)
CREAT SERPL-MCNC: 1.1 MG/DL (ref 0.5–1.4)
ERYTHROCYTE [DISTWIDTH] IN BLOOD BY AUTOMATED COUNT: 14.6 % (ref 11.5–14.5)
EST. GFR  (NO RACE VARIABLE): >60 ML/MIN/1.73 M^2
GLUCOSE SERPL-MCNC: 100 MG/DL (ref 70–110)
HCT VFR BLD AUTO: 33.8 % (ref 40–54)
HCV RNA SERPL QL NAA+PROBE: NOT DETECTED
HCV RNA SPEC NAA+PROBE-ACNC: NOT DETECTED IU/ML
HGB BLD-MCNC: 10.4 G/DL (ref 14–18)
HGB BLD-MCNC: 11.6 G/DL (ref 14–18)
MCH RBC QN AUTO: 25.6 PG (ref 27–31)
MCHC RBC AUTO-ENTMCNC: 30.8 G/DL (ref 32–36)
MCV RBC AUTO: 83 FL (ref 82–98)
PLATELET # BLD AUTO: 269 K/UL (ref 150–450)
PMV BLD AUTO: 10.4 FL (ref 9.2–12.9)
POTASSIUM SERPL-SCNC: 4.1 MMOL/L (ref 3.5–5.1)
PROT SERPL-MCNC: 7.4 G/DL (ref 6–8.4)
RBC # BLD AUTO: 4.06 M/UL (ref 4.6–6.2)
RPR SER QL: REACTIVE
RPR SER-TITR: ABNORMAL {TITER}
SODIUM SERPL-SCNC: 136 MMOL/L (ref 136–145)
WBC # BLD AUTO: 4.34 K/UL (ref 3.9–12.7)

## 2023-02-08 PROCEDURE — 25000003 PHARM REV CODE 250: Performed by: INTERNAL MEDICINE

## 2023-02-08 PROCEDURE — 99222 PR INITIAL HOSPITAL CARE,LEVL II: ICD-10-PCS | Mod: ,,, | Performed by: HOSPITALIST

## 2023-02-08 PROCEDURE — 99204 OFFICE O/P NEW MOD 45 MIN: CPT | Mod: ,,, | Performed by: INTERNAL MEDICINE

## 2023-02-08 PROCEDURE — 85027 COMPLETE CBC AUTOMATED: CPT | Performed by: HOSPITALIST

## 2023-02-08 PROCEDURE — 99214 PR OFFICE/OUTPT VISIT, EST, LEVL IV, 30-39 MIN: ICD-10-PCS | Mod: ,,, | Performed by: INTERNAL MEDICINE

## 2023-02-08 PROCEDURE — G0378 HOSPITAL OBSERVATION PER HR: HCPCS

## 2023-02-08 PROCEDURE — 63600175 PHARM REV CODE 636 W HCPCS: Performed by: INTERNAL MEDICINE

## 2023-02-08 PROCEDURE — 99204 PR OFFICE/OUTPT VISIT, NEW, LEVL IV, 45-59 MIN: ICD-10-PCS | Mod: ,,, | Performed by: INTERNAL MEDICINE

## 2023-02-08 PROCEDURE — 99222 1ST HOSP IP/OBS MODERATE 55: CPT | Mod: ,,, | Performed by: HOSPITALIST

## 2023-02-08 PROCEDURE — 86359 T CELLS TOTAL COUNT: CPT | Performed by: HOSPITALIST

## 2023-02-08 PROCEDURE — 99214 OFFICE O/P EST MOD 30 MIN: CPT | Mod: ,,, | Performed by: INTERNAL MEDICINE

## 2023-02-08 PROCEDURE — 80053 COMPREHEN METABOLIC PANEL: CPT | Performed by: HOSPITALIST

## 2023-02-08 PROCEDURE — 96361 HYDRATE IV INFUSION ADD-ON: CPT

## 2023-02-08 PROCEDURE — 86360 T CELL ABSOLUTE COUNT/RATIO: CPT | Performed by: HOSPITALIST

## 2023-02-08 PROCEDURE — 86593 SYPHILIS TEST NON-TREP QUANT: CPT | Performed by: HOSPITALIST

## 2023-02-08 PROCEDURE — 96372 THER/PROPH/DIAG INJ SC/IM: CPT | Performed by: HOSPITALIST

## 2023-02-08 PROCEDURE — 25000003 PHARM REV CODE 250: Performed by: HOSPITALIST

## 2023-02-08 PROCEDURE — 96367 TX/PROPH/DG ADDL SEQ IV INF: CPT

## 2023-02-08 PROCEDURE — 86592 SYPHILIS TEST NON-TREP QUAL: CPT | Performed by: HOSPITALIST

## 2023-02-08 PROCEDURE — 82550 ASSAY OF CK (CPK): CPT | Performed by: HOSPITALIST

## 2023-02-08 PROCEDURE — 85018 HEMOGLOBIN: CPT | Mod: 91 | Performed by: INTERNAL MEDICINE

## 2023-02-08 PROCEDURE — 86780 TREPONEMA PALLIDUM: CPT | Performed by: HOSPITALIST

## 2023-02-08 PROCEDURE — 63600175 PHARM REV CODE 636 W HCPCS: Performed by: HOSPITALIST

## 2023-02-08 PROCEDURE — 36415 COLL VENOUS BLD VENIPUNCTURE: CPT | Performed by: INTERNAL MEDICINE

## 2023-02-08 RX ORDER — METRONIDAZOLE 500 MG/1
500 TABLET ORAL EVERY 8 HOURS
Status: DISCONTINUED | OUTPATIENT
Start: 2023-02-08 | End: 2023-02-09 | Stop reason: HOSPADM

## 2023-02-08 RX ORDER — OXYCODONE HYDROCHLORIDE 5 MG/1
5 TABLET ORAL EVERY 6 HOURS PRN
Status: DISCONTINUED | OUTPATIENT
Start: 2023-02-08 | End: 2023-02-09 | Stop reason: HOSPADM

## 2023-02-08 RX ORDER — DOXYCYCLINE HYCLATE 100 MG
100 TABLET ORAL EVERY 12 HOURS
Status: DISCONTINUED | OUTPATIENT
Start: 2023-02-08 | End: 2023-02-09 | Stop reason: HOSPADM

## 2023-02-08 RX ORDER — CEFTRIAXONE 1 G/1
0.5 INJECTION, POWDER, FOR SOLUTION INTRAMUSCULAR; INTRAVENOUS ONCE
Status: COMPLETED | OUTPATIENT
Start: 2023-02-08 | End: 2023-02-08

## 2023-02-08 RX ORDER — LIDOCAINE HYDROCHLORIDE 10 MG/ML
2.1 INJECTION INFILTRATION; PERINEURAL ONCE
Status: COMPLETED | OUTPATIENT
Start: 2023-02-08 | End: 2023-02-08

## 2023-02-08 RX ORDER — ACETAMINOPHEN 500 MG
1000 TABLET ORAL 2 TIMES DAILY PRN
COMMUNITY

## 2023-02-08 RX ADMIN — METRONIDAZOLE 500 MG: 500 TABLET ORAL at 10:02

## 2023-02-08 RX ADMIN — METRONIDAZOLE 500 MG: 500 TABLET ORAL at 02:02

## 2023-02-08 RX ADMIN — CEFTRIAXONE 2 G: 2 INJECTION, POWDER, FOR SOLUTION INTRAMUSCULAR; INTRAVENOUS at 10:02

## 2023-02-08 RX ADMIN — CEFTRIAXONE 0.5 G: 1 INJECTION, POWDER, FOR SOLUTION INTRAMUSCULAR; INTRAVENOUS at 01:02

## 2023-02-08 RX ADMIN — DOXYCYCLINE HYCLATE 100 MG: 100 TABLET, COATED ORAL at 10:02

## 2023-02-08 RX ADMIN — DOXYCYCLINE HYCLATE 100 MG: 100 TABLET, COATED ORAL at 01:02

## 2023-02-08 RX ADMIN — LIDOCAINE HYDROCHLORIDE 2.1 ML: 10 INJECTION, SOLUTION INFILTRATION; PERINEURAL at 01:02

## 2023-02-08 RX ADMIN — OXYCODONE 5 MG: 5 TABLET ORAL at 06:02

## 2023-02-08 RX ADMIN — SODIUM CHLORIDE: 9 INJECTION, SOLUTION INTRAVENOUS at 08:02

## 2023-02-08 RX ADMIN — SODIUM CHLORIDE: 9 INJECTION, SOLUTION INTRAVENOUS at 12:02

## 2023-02-08 RX ADMIN — OXYCODONE 5 MG: 5 TABLET ORAL at 10:02

## 2023-02-08 RX ADMIN — BICTEGRAVIR SODIUM, EMTRICITABINE, AND TENOFOVIR ALAFENAMIDE FUMARATE 1 TABLET: 50; 200; 25 TABLET ORAL at 08:02

## 2023-02-08 RX ADMIN — SODIUM CHLORIDE: 9 INJECTION, SOLUTION INTRAVENOUS at 10:02

## 2023-02-08 RX ADMIN — DOXYCYCLINE HYCLATE 100 MG: 100 TABLET, COATED ORAL at 08:02

## 2023-02-08 NOTE — HPI
48 yo M with PMhx HIV on biktarvy who presents to the ED complaining of rectal bleeding and rectal/anal pain x ~ 2 weeks. Pt. Reports he started having small amounts of rectal bleeding as well as tenesmus/urgency. The symptoms have progressively worsened over the last week with increased amounts of recal bleeding and anal discharge. He states he is afraid to eat because it will trigger cramping or a BM that would make his pain rectal worse. He denies any fevers, chills, nausea, or vomiting. He has had some mild LLQ lower abdominal pain/cramping. Pt. Is sexually active  with men and has receptive anal intercourse. He saw his PCP for these symptoms and reports that he testted negative for GC/CT and syphilis at that time, but he was not prescribed any medications or abx. He reports compliance with his biktarvy medication with undetectable viral load on his last labs.

## 2023-02-08 NOTE — ASSESSMENT & PLAN NOTE
-Pt. Rectal pain, bleeding, and discharge in setting of MSM. Presents febrile to 100.4. No anal fissures or tears noted on exam  -Empiric treatment started for GC/CT with IM ceftriaxone x1, doxycycline. Pt. Also given zoyn in the ED Syphilis and rectal GC/CT ordered and pending  -ID consulted for further assistance with abx, f/u culture results. GI consulted for potential anoscopy

## 2023-02-08 NOTE — SUBJECTIVE & OBJECTIVE
"Past Medical History:   Diagnosis Date    HIV infection        Past Surgical History:   Procedure Laterality Date    COLONOSCOPY N/A 5/26/2021    Procedure: COLONOSCOPY;  Surgeon: Kaleb Magdaleno MD;  [facility] ([address] [place] [address]);  Service: Endoscopy;  Laterality: N/A;  outside referral from [facility] health care for Colonoscopy see media tab records -   cOVID test on 5/23/21 at [facility] -     JOINT REPLACEMENT      ankle surgery(L)       Review of patient's allergies indicates:   Allergen Reactions    Sulfamethoxazole-trimethoprim Other (See Comments)     "Headaches and sickness"  "Headaches and sickness"       Family History    None       Tobacco Use    Smoking status: Never    Smokeless tobacco: Never   Substance and Sexual Activity    Alcohol use: Yes     Comment: socially     Drug use: Never    Sexual activity: Not on file     Review of Systems   Constitutional:  Negative for chills, fever and unexpected weight change.   HENT:  Negative for sore throat and trouble swallowing.    Eyes:  Negative for pain and visual disturbance.   Respiratory:  Negative for cough and shortness of breath.    Cardiovascular:  Negative for chest pain and palpitations.   Gastrointestinal:  Positive for blood in stool, constipation and rectal pain. Negative for abdominal pain, nausea and vomiting.   Genitourinary:  Negative for dysuria and frequency.   Musculoskeletal:  Negative for arthralgias, joint swelling and myalgias.   Skin:  Negative for color change and pallor.   Neurological:  Negative for dizziness and light-headedness.   Psychiatric/Behavioral:  Negative for agitation and confusion.    Objective:     Vital Signs (Most Recent):  Temp: 98.6 °F (37 °C) (02/08/23 0702)  Pulse: 77 (02/08/23 0702)  Resp: 20 (02/08/23 0639)  BP: (!) 145/89 (02/08/23 0702)  SpO2: 99 % (02/08/23 0702)   Vital Signs (24h Range):  Temp:  [98.5 °F (36.9 °C)-100.4 °F (38 °C)] 98.6 °F (37 °C)  Pulse:  [] 77  Resp:  [18-20] 20  SpO2:  [96 " %-100 %] 99 %  BP: (125-150)/(69-94) 145/89     Weight: 83 kg (183 lb) (02/07/23 1429)  Body mass index is 27.02 kg/m².      Intake/Output Summary (Last 24 hours) at 2/8/2023 0921  Last data filed at 2/8/2023 0652  Gross per 24 hour   Intake 2142.08 ml   Output 1075 ml   Net 1067.08 ml       Lines/Drains/Airways       Peripheral Intravenous Line  Duration                  Peripheral IV - Single Lumen 02/07/23 1518 18 G Right Antecubital <1 day         Peripheral IV - Single Lumen 02/07/23 1747 20 G Left Antecubital <1 day                    Physical Exam  Vitals and nursing note reviewed.   Constitutional:       General: He is not in acute distress.     Appearance: He is not ill-appearing.   HENT:      Head: Normocephalic and atraumatic.      Right Ear: External ear normal.      Left Ear: External ear normal.      Nose: Nose normal.      Mouth/Throat:      Pharynx: Oropharynx is clear. No oropharyngeal exudate.   Eyes:      General: No scleral icterus.     Conjunctiva/sclera: Conjunctivae normal.   Cardiovascular:      Rate and Rhythm: Normal rate and regular rhythm.      Pulses: Normal pulses.      Heart sounds: Normal heart sounds.   Pulmonary:      Effort: Pulmonary effort is normal. No respiratory distress.      Breath sounds: Normal breath sounds.   Abdominal:      General: There is no distension.      Palpations: Abdomen is soft.      Tenderness: There is no abdominal tenderness.   Genitourinary:     Comments: Small perianal skin tag  No external hemorrhoids  Musculoskeletal:         General: Normal range of motion.      Cervical back: Normal range of motion.      Right lower leg: No edema.      Left lower leg: No edema.   Skin:     General: Skin is warm and dry.   Neurological:      General: No focal deficit present.      Mental Status: He is alert and oriented to person, place, and time.   Psychiatric:         Mood and Affect: Mood normal.         Behavior: Behavior normal.       Significant Labs:  CBC:    Recent Labs   Lab 02/07/23  1519 02/08/23  0332   WBC 6.41 4.34   HGB 12.3* 10.4*   HCT 39.3* 33.8*    269     CMP:   Recent Labs   Lab 02/08/23  0332      CALCIUM 8.5*   ALBUMIN 2.8*   PROT 7.4      K 4.1   CO2 20*      BUN 10   CREATININE 1.1   ALKPHOS 59   ALT 8*   AST 15   BILITOT 0.5       Significant Imaging:  Imaging results within the past 24 hours have been reviewed.

## 2023-02-08 NOTE — H&P
[facility] - Emergency Dept  [facility] Medicine  History & Physical    Patient Name: Jason Coy  MRN: [id number]  Patient Class: OP- Observation  Admission Date: 2/7/2023  Attending Physician: Key Earl MD   Primary Care Provider: Primary Doctor No         Patient information was obtained from patient, past medical records and ER records.     Subjective:     Principal Problem:Proctitis    Chief Complaint:   Chief Complaint   Patient presents with    Rectal Bleeding     Passing bright red blood for 2 weeks, started with blood in urine        HPI: 46 yo M with PMhx HIV on biktarvy who presents to the ED complaining of rectal bleeding and rectal/anal pain x ~ 2 weeks. Pt. Reports he started having small amounts of rectal bleeding as well as tenesmus/urgency. The symptoms have progressively worsened over the last week with increased amounts of recal bleeding and anal discharge. He states he is afraid to eat because it will trigger cramping or a BM that would make his pain rectal worse. He denies any fevers, chills, nausea, or vomiting. He has had some mild LLQ lower abdominal pain/cramping. Pt. Is sexually active  with men and has receptive anal intercourse. He saw his PCP for these symptoms and reports that he testted negative for GC/CT and syphilis at that time, but he was not prescribed any medications or abx. He reports compliance with his biktarvy medication with undetectable viral load on his last labs.      Past Medical History:   Diagnosis Date    HIV infection        Past Surgical History:   Procedure Laterality Date    COLONOSCOPY N/A 5/26/2021    Procedure: COLONOSCOPY;  Surgeon: Kaleb Magdaleno MD;  Location: [address];  Service: Endoscopy;  Laterality: N/A;  outside referral from [facility] for Colonoscopy see media tab records -   cOVID test on 5/23/21 at [facility] -     JOINT REPLACEMENT      ankle surgery(L)       Review of patient's allergies indicates:   Allergen  "Reactions    Sulfamethoxazole-trimethoprim Other (See Comments)     "Headaches and sickness"  "Headaches and sickness"         No current facility-administered medications on file prior to encounter.     Current Outpatient Medications on File Prior to Encounter   Medication Sig    BIKTARVY -25 mg per tablet     ergocalciferol (ERGOCALCIFEROL) 50,000 unit Cap     traZODone (DESYREL) 50 MG tablet Take 50 mg by mouth every evening.     Family History    None       Tobacco Use    Smoking status: Never    Smokeless tobacco: Never   Substance and Sexual Activity    Alcohol use: Yes     Comment: socially     Drug use: Never    Sexual activity: Not on file     Review of Systems   Constitutional:  Positive for chills. Negative for activity change, fever and unexpected weight change.   HENT:  Negative for congestion and sore throat.    Respiratory:  Negative for cough, shortness of breath and wheezing.    Cardiovascular:  Negative for chest pain, palpitations and leg swelling.   Gastrointestinal:  Positive for anal bleeding, blood in stool and rectal pain. Negative for abdominal pain, nausea and vomiting.   Genitourinary:  Negative for dysuria and hematuria.   Musculoskeletal:  Negative for arthralgias and neck pain.   Skin:  Negative for color change and rash.   Neurological:  Negative for dizziness, seizures and numbness.   Psychiatric/Behavioral:  Negative for hallucinations and suicidal ideas.    Objective:     Vital Signs (Most Recent):  Temp: 98.7 °F (37.1 °C) (02/07/23 2002)  Pulse: 67 (02/07/23 2234)  Resp: 18 (02/07/23 2107)  BP: 137/69 (02/07/23 2234)  SpO2: 99 % (02/07/23 2234) Vital Signs (24h Range):  Temp:  [98.5 °F (36.9 °C)-100.4 °F (38 °C)] 98.7 °F (37.1 °C)  Pulse:  [] 67  Resp:  [18] 18  SpO2:  [98 %-99 %] 99 %  BP: (125-149)/(69-94) 137/69     Weight: 83 kg (183 lb)  Body mass index is 27.02 kg/m².    Physical Exam  Vitals reviewed.   Constitutional:       General: He is not in acute " distress.     Appearance: He is well-developed.   HENT:      Head: Normocephalic and atraumatic.   Eyes:      Extraocular Movements: Extraocular movements intact.      Pupils: Pupils are equal, round, and reactive to light.   Neck:      Vascular: No JVD.      Trachea: No tracheal deviation.   Cardiovascular:      Rate and Rhythm: Normal rate and regular rhythm.      Heart sounds: No murmur heard.    No friction rub. No gallop.   Pulmonary:      Effort: No respiratory distress.      Breath sounds: Normal breath sounds. No wheezing or rales.   Abdominal:      General: Bowel sounds are normal. There is no distension.      Palpations: Abdomen is soft. There is no mass.      Tenderness: There is abdominal tenderness.      Comments: Mild LLQ tenderness, no rebound or guarding   Genitourinary:     Comments: No visualized anal fissures or inflammation, tenderness present on rectal exam with some blood, no palpable abscess  Musculoskeletal:         General: No deformity.      Cervical back: Neck supple.   Lymphadenopathy:      Cervical: No cervical adenopathy.   Skin:     General: Skin is warm and dry.      Findings: No rash.   Neurological:      Mental Status: He is alert and oriented to person, place, and time.       CRANIAL NERVES     CN III, IV, VI   Pupils are equal, round, and reactive to light.     Significant Labs: All pertinent labs within the past 24 hours have been reviewed.    Significant Imaging: I have reviewed all pertinent imaging results/findings within the past 24 hours.    Assessment/Plan:     * Proctitis  -Pt. Rectal pain, bleeding, and discharge in setting of MSM. Presents febrile to 100.4. No anal fissures or tears noted on exam  -Empiric treatment started for GC/CT with IM ceftriaxone x1, doxycycline. Pt. Also given zoyn in the ED Syphilis and rectal GC/CT ordered and pending  -ID consulted for further assistance with abx, f/u culture results. GI consulted for potential anoscopy      PUSHPA (acute kidney  injury)  -Cr 1.5 on admit, previous labs show Cr 1.1 suggesting pt. Has prior mild CKD  -Suspect prerenal etiology in setting of proctitis and poor PO intake  -IV fluids ordered, continue to trend      HIV disease  -Pt. Reports negative viral load at last lab testing outpatient, compliant with biktarvy. Continue home biktarvy, f/u CD4 count  -ID consult for further assistance        VTE Risk Mitigation (From admission, onward)           Ordered     IP VTE LOW RISK PATIENT  Once         02/07/23 2343     Place sequential compression device  Until discontinued         02/07/23 2491                       Gunner Austin MD  Department of Hospital Medicine   [facility] - Emergency Dept

## 2023-02-08 NOTE — ED NOTES
Telemetry Verification   Patient placed on Telemetry Box  Verified with War Room  Box # [id number]   Monitor Tech    Rate 77   Rhythm NS

## 2023-02-08 NOTE — ASSESSMENT & PLAN NOTE
Proctitis confirmed on imaging.  Examination did not reveal any evidence of ulcerations however patient does report secretions and bleeding.  Differential includes proctitis secondary to chlamydia, gonorrhea, and lymphogranuloma venereum among others. During my evaluation the patient received a call from his outpatient provider who reported a positive rectal chlamydia test.   · Discontinue Zosyn.  · Start ceftriaxone 2 gm IV daily.  · Start metronidazole 500 mg PO every 8 hours in the setting of diverticulitis.  · Agree with doxycycline 100 mg PO BID.  · Will plan to transition to cefadroxil 1 gm BID plus metronidazole 500 mg PO TID for 5 days for discharge.

## 2023-02-08 NOTE — ASSESSMENT & PLAN NOTE
-Pt. Reports negative viral load at last lab testing outpatient, compliant with biktarvy. Continue home biktarvy, f/u CD4 count  -ID consult for further assistance

## 2023-02-08 NOTE — ASSESSMENT & PLAN NOTE
Possible also diverticulitis of the distal sigmoid colon seen on imaging.    · Antibiotics as above.    · Diverticular diet.

## 2023-02-08 NOTE — CONSULTS
[facility] - Emergency Dept  Infectious Disease  Consult Note    Patient Name: Jason Coy  MRN: [id number]  Admission Date: 2/7/2023  Hospital Length of Stay: 0 days  Attending Physician: Key Earl MD  Primary Care Provider: Primary Doctor No     Isolation Status: No active isolations    Patient information was obtained from patient, past medical records and ER records.      Inpatient consult to Infectious Diseases  Consult performed by: Alison Mendoza MD  Consult ordered by: Gunner Austin MD        Assessment/Plan:     * Chlamydial proctitis  Proctitis confirmed on imaging.  Examination did not reveal any evidence of ulcerations however patient does report secretions and bleeding.  Differential includes proctitis secondary to chlamydia, gonorrhea, and lymphogranuloma venereum among others. During my evaluation the patient received a call from his outpatient provider who reported a positive rectal chlamydia test.   · Discontinue Zosyn.  · Start ceftriaxone 2 gm IV daily.  · Start metronidazole 500 mg PO every 8 hours in the setting of diverticulitis.  · Agree with doxycycline 100 mg PO BID.  · Will plan to transition to cefadroxil 1 gm BID plus metronidazole 500 mg PO TID for 5 days for discharge.    Syphilis  I have reviewed hospital notes, laboratory, imaging tests, and microbiologic studies from the primary HM service and other specialty providers providers, as indicated for this evaluation, with my interpretation as documented. RPR positive at 1:16. No baseline testing.   · Continue doxycycline 100 mg BID.  · Will plan for a 4 week course.  · Patient will need to follow up with his established provider for repeat RPR in 6 months.        Diverticulitis  Possible also diverticulitis of the distal sigmoid colon seen on imaging.    · Antibiotics as above.    · Diverticular diet.    HIV disease  Chronic and stable.  · Continue Biktarvy.   · Outpatient follow up with his provider.      Thank you for  "your consult. I will follow-up with patient. Please contact us if you have any additional questions.    Alison Mendoza MD  Infectious Disease  [facility] - Emergency Dept    Subjective:     Principal Problem: Chlamydial proctitis    HPI: A 47-year-old man with HIV virologically suppressed and on ART with Biktarvy whom 2 weeks prior to admission developed progressively worsening rectal pain. This was associated with urgency, tenesmus and rectal bleeding. His pain was made work with micturition and bowel movements. He was evaluated by his HIV provider, Issac Murphy NP, with negative urine for GC/Chlamydia. He sought care in [facility] ED due to severe rectal pain. On evaluation he was afebrile. Examination was reported to have rectal pain on palpation with bleeding however no ulcers noted. Laboratory work up revealed an elevated creatinine from baseline and imaging was consistent with diverticulitis plus proctitis. He was admitted to Observation under  service and started on Zosyn and doxycycline.    Mr. Coy is intolerant of Bactrim. He has anal receptive sex with men and uses condoms consistently however he does report one encounter where barrier protection broke.     Infectious Diseases consulted for "HIV patient on HARRT with proctitis, sexually active with men"          Past Medical History:   Diagnosis Date    HIV infection        Past Surgical History:   Procedure Laterality Date    COLONOSCOPY N/A 5/26/2021    Procedure: COLONOSCOPY;  Surgeon: Kaleb Magdaleno MD;  Location: [address]);  Service: Endoscopy;  Laterality: N/A;  outside referral from [facility] health care for Colonoscopy see media tab records -   cOVID test on 5/23/21 at [facility] -     JOINT REPLACEMENT      ankle surgery(L)       Review of patient's allergies indicates:   Allergen Reactions    Sulfamethoxazole-trimethoprim Other (See Comments)     "Headaches and sickness"  "Headaches and sickness"   "       Medications:  Medications Prior to Admission   Medication Sig    acetaminophen (TYLENOL) 500 MG tablet Take 1,000 mg by mouth 2 (two) times daily as needed (headache).    zwwsgkhwz-jxovhcis-wwwxsrd ala (BIKTARVY) -25 mg (25 kg or greater) Take 1 tablet by mouth once daily.    semaglutide (OZEMPIC) 0.25 mg or 0.5 mg(2 mg/1.5 mL) pen injector Inject 0.5 mg into the skin every Monday.     Antibiotics (From admission, onward)      Start     Stop Route Frequency Ordered    02/08/23 1400  metroNIDAZOLE tablet 500 mg         -- Oral Every 8 hours 02/08/23 0940    02/08/23 1045  cefTRIAXone (ROCEPHIN) 2 g in dextrose 5 % in water (D5W) 5 % 50 mL IVPB (MB+)         -- IV Every 24 hours (non-standard times) 02/08/23 0940    02/08/23 0015  doxycycline tablet 100 mg         -- Oral Every 12 hours 02/08/23 0005          Antifungals (From admission, onward)      None          Antivirals (From admission, onward)          Stop Route Frequency     jgoiqytex-lylnboow-dbknaus ala         -- Oral Daily               There is no immunization history on file for this patient.    Family History    None       Social History     Socioeconomic History    Marital status: Single   Tobacco Use    Smoking status: Never    Smokeless tobacco: Never   Substance and Sexual Activity    Alcohol use: Yes     Comment: socially     Drug use: Never     Review of Systems   Constitutional:  Negative for chills, fatigue and fever.   HENT:  Negative for ear pain, mouth sores, nosebleeds, postnasal drip, rhinorrhea, sinus pressure, sore throat, tinnitus, trouble swallowing and voice change.    Eyes:  Negative for photophobia, pain, redness and visual disturbance.   Respiratory:  Negative for apnea, cough, chest tightness, shortness of breath and wheezing.    Cardiovascular:  Negative for chest pain, palpitations and leg swelling.   Gastrointestinal:  Positive for blood in stool and rectal pain. Negative for abdominal pain, constipation,  diarrhea, nausea and vomiting.   Endocrine: Negative for cold intolerance, heat intolerance, polydipsia and polyuria.   Genitourinary:  Negative for decreased urine volume, difficulty urinating, dysuria, flank pain, frequency, genital sores, hematuria, penile discharge, penile pain, penile swelling, scrotal swelling, testicular pain and urgency.   Musculoskeletal:  Negative for arthralgias, back pain, joint swelling, myalgias and neck pain.   Skin:  Negative for color change and rash.   Allergic/Immunologic: Negative for environmental allergies and food allergies.   Neurological:  Negative for dizziness, seizures, syncope, weakness, light-headedness, numbness and headaches.   Hematological:  Negative for adenopathy. Does not bruise/bleed easily.   Psychiatric/Behavioral:  Negative for agitation, confusion, decreased concentration, hallucinations, self-injury, sleep disturbance and suicidal ideas. The patient is not nervous/anxious.    Objective:     Vital Signs (Most Recent):  Temp: 100.3 °F (37.9 °C) (02/08/23 1734)  Pulse: 93 (02/08/23 1734)  Resp: 18 (02/08/23 1734)  BP: (!) 145/85 (02/08/23 1734)  SpO2: 98 % (02/08/23 1734)   Vital Signs (24h Range):  Temp:  [98.5 °F (36.9 °C)-100.3 °F (37.9 °C)] 100.3 °F (37.9 °C)  Pulse:  [64-93] 93  Resp:  [18-20] 18  SpO2:  [96 %-100 %] 98 %  BP: (126-151)/(69-95) 145/85     Weight: 83 kg (183 lb)  Body mass index is 27.02 kg/m².    Estimated Creatinine Clearance: 83.9 mL/min (based on SCr of 1.1 mg/dL).    Physical Exam  Vitals reviewed.   Constitutional:       Appearance: He is well-developed.   HENT:      Head: Normocephalic and atraumatic.      Right Ear: External ear normal.      Left Ear: External ear normal.   Eyes:      Conjunctiva/sclera: Conjunctivae normal.   Neck:      Thyroid: No thyromegaly.   Cardiovascular:      Rate and Rhythm: Normal rate and regular rhythm.      Heart sounds: Normal heart sounds. No murmur heard.  Pulmonary:      Effort: Pulmonary effort  is normal.      Breath sounds: Normal breath sounds. No wheezing or rales.   Abdominal:      General: Bowel sounds are normal.      Palpations: Abdomen is soft. There is no mass.      Tenderness: There is no abdominal tenderness. There is no rebound.   Musculoskeletal:         General: Normal range of motion.      Cervical back: Normal range of motion and neck supple.   Lymphadenopathy:      Cervical: No cervical adenopathy.   Skin:     General: Skin is warm and dry.   Neurological:      Mental Status: He is alert and oriented to person, place, and time.   Psychiatric:         Behavior: Behavior normal.       Significant Labs: Blood Culture:   Recent Labs   Lab 02/07/23  1728 02/07/23  1743   LABBLOO No Growth to date No Growth to date     BMP:   Recent Labs   Lab 02/08/23  0332         K 4.1      CO2 20*   BUN 10   CREATININE 1.1   CALCIUM 8.5*     CBC:   Recent Labs   Lab 02/07/23  1519 02/08/23  0332 02/08/23  1645   WBC 6.41 4.34  --    HGB 12.3* 10.4* 11.6*   HCT 39.3* 33.8*  --     269  --      HIV 1/2 Antibodies: No results for input(s): [id number] in the last 48 hours.  Urine Culture: No results for input(s): LABURIN in the last 4320 hours.  Urine Studies:   Recent Labs   Lab 02/07/23 2014   COLORU Yellow   APPEARANCEUA Clear   PHUR 6.0   SPECGRAV >1.030*   PROTEINUA Trace*   GLUCUA Negative   KETONESU 1+*   BILIRUBINUA Negative   OCCULTUA Trace*   NITRITE Negative   LEUKOCYTESUR Negative     Lab Results   Component Value Date    RPR Reactive (A) 02/08/2023    PRPQ 1:16 (A) 02/08/2023         Significant Imaging: I have reviewed all pertinent imaging results/findings within the past 24 hours.

## 2023-02-08 NOTE — CONSULTS
[facility] Emergency Dept  Gastroenterology  Consult Note    Patient Name: Jason Coy  MRN: [id number]  Admission Date: 2/7/2023  Hospital Length of Stay: 0 days  Code Status: Full Code   Attending Provider: Key Earl MD   Consulting Provider: Malvin Escoto MD  Primary Care Physician: Primary Doctor No  Principal Problem:Proctitis    Inpatient consult to Gastroenterology  Consult performed by: Malvin Escoto MD  Consult ordered by: Ulisses De Luna MD      Subjective:     HPI:  47M with HIV (on Biktarvy with good compliance, reports undetectable viral load though unable to confirm via chart review) here with bright red rectal bleeding and pain x 2 weeks. Reports he first noticed some blood-tinged urination about 2 weeks ago, lasted for a few days and then resolved. Then he began to noticed blood-streaked stools, with bright-red blood. Around this time noticed a significant amount of rectal pain, which has continued. Also noted tenesmus but then was unable to have a BM. Last BM he recalls was about 1.5 weeks ago. Since then he has had continued pain and tenesmus and has been intermittently passing bright red blood with some mixed clots. Seen by his PCP and was swabbed for STIs, which were reportedly negative. Pain and bleeding did not go away so he presented to the ED. Also reports a several month history of infrequent night sweats, with approximately 2 episodes of this in the past 2 weeks. Otherwise he fells well and denies fevers or chills, unintended weight loss. No N/V, preceding diarrhea, dysuria or abdominal pain. He had a colonoscopy in 2021 significant for a small hyperplastic polyp in the sigmoid.     In the ED, he was febrile to 100.4F, HDS. Labs notable for mild PUSHPA with Cr to 1.5. CTA was done which showed no evidence of active extravasation, however was significant for rectal inflammation likely representing proctitis and possible mild sigmoid diverticulitis. He was started on  "doxycycline and fluid resuscitated. GI was consulted for rectal bleeding.      Past Medical History:   Diagnosis Date    HIV infection        Past Surgical History:   Procedure Laterality Date    COLONOSCOPY N/A 5/26/2021    Procedure: COLONOSCOPY;  Surgeon: Kaleb Magdaleno MD;  Location: [facility] ([address] [place] [address]);  Service: Endoscopy;  Laterality: N/A;  outside referral from [facility] care for Colonoscopy see media tab records -   cOVID test on 5/23/21 at [facility] -     JOINT REPLACEMENT      ankle surgery(L)       Review of patient's allergies indicates:   Allergen Reactions    Sulfamethoxazole-trimethoprim Other (See Comments)     "Headaches and sickness"  "Headaches and sickness"       Family History    None       Tobacco Use    Smoking status: Never    Smokeless tobacco: Never   Substance and Sexual Activity    Alcohol use: Yes     Comment: socially     Drug use: Never    Sexual activity: Not on file     Review of Systems   Constitutional:  Negative for chills, fever and unexpected weight change.   HENT:  Negative for sore throat and trouble swallowing.    Eyes:  Negative for pain and visual disturbance.   Respiratory:  Negative for cough and shortness of breath.    Cardiovascular:  Negative for chest pain and palpitations.   Gastrointestinal:  Positive for blood in stool, constipation and rectal pain. Negative for abdominal pain, nausea and vomiting.   Genitourinary:  Negative for dysuria and frequency.   Musculoskeletal:  Negative for arthralgias, joint swelling and myalgias.   Skin:  Negative for color change and pallor.   Neurological:  Negative for dizziness and light-headedness.   Psychiatric/Behavioral:  Negative for agitation and confusion.    Objective:     Vital Signs (Most Recent):  Temp: 98.6 °F (37 °C) (02/08/23 0702)  Pulse: 77 (02/08/23 0702)  Resp: 20 (02/08/23 0639)  BP: (!) 145/89 (02/08/23 0702)  SpO2: 99 % (02/08/23 0702)   Vital Signs (24h Range):  Temp:  [98.5 °F (36.9 °C)-100.4 °F " (38 °C)] 98.6 °F (37 °C)  Pulse:  [] 77  Resp:  [18-20] 20  SpO2:  [96 %-100 %] 99 %  BP: (125-150)/(69-94) 145/89     Weight: 83 kg (183 lb) (02/07/23 1429)  Body mass index is 27.02 kg/m².      Intake/Output Summary (Last 24 hours) at 2/8/2023 0921  Last data filed at 2/8/2023 0652  Gross per 24 hour   Intake 2142.08 ml   Output 1075 ml   Net 1067.08 ml       Lines/Drains/Airways       Peripheral Intravenous Line  Duration                  Peripheral IV - Single Lumen 02/07/23 1518 18 G Right Antecubital <1 day         Peripheral IV - Single Lumen 02/07/23 1747 20 G Left Antecubital <1 day                    Physical Exam  Vitals and nursing note reviewed.   Constitutional:       General: He is not in acute distress.     Appearance: He is not ill-appearing.   HENT:      Head: Normocephalic and atraumatic.      Right Ear: External ear normal.      Left Ear: External ear normal.      Nose: Nose normal.      Mouth/Throat:      Pharynx: Oropharynx is clear. No oropharyngeal exudate.   Eyes:      General: No scleral icterus.     Conjunctiva/sclera: Conjunctivae normal.   Cardiovascular:      Rate and Rhythm: Normal rate and regular rhythm.      Pulses: Normal pulses.      Heart sounds: Normal heart sounds.   Pulmonary:      Effort: Pulmonary effort is normal. No respiratory distress.      Breath sounds: Normal breath sounds.   Abdominal:      General: There is no distension.      Palpations: Abdomen is soft.      Tenderness: There is no abdominal tenderness.   Genitourinary:     Comments: Small perianal skin tag  No external hemorrhoids  Musculoskeletal:         General: Normal range of motion.      Cervical back: Normal range of motion.      Right lower leg: No edema.      Left lower leg: No edema.   Skin:     General: Skin is warm and dry.   Neurological:      General: No focal deficit present.      Mental Status: He is alert and oriented to person, place, and time.   Psychiatric:         Mood and Affect: Mood  normal.         Behavior: Behavior normal.       Significant Labs:  CBC:   Recent Labs   Lab 02/07/23  1519 02/08/23  0332   WBC 6.41 4.34   HGB 12.3* 10.4*   HCT 39.3* 33.8*    269     CMP:   Recent Labs   Lab 02/08/23  0332      CALCIUM 8.5*   ALBUMIN 2.8*   PROT 7.4      K 4.1   CO2 20*      BUN 10   CREATININE 1.1   ALKPHOS 59   ALT 8*   AST 15   BILITOT 0.5       Significant Imaging:  Imaging results within the past 24 hours have been reviewed.    Assessment/Plan:     * Proctitis  Patient with 2 week history of bright red blood per rectum with associated tenesmus and rectal pain in setting of HIV infection (reportedly well controlled, CD4 pending) and receptive anal intercourse. Mild fever on admission. History of some intermittent night sweats though no other constitutional symptoms to suggest malignancy. Imaging with likely proctitis (though neoplasm not excluded). Normal colonoscopy in 2021, which makes a neoplasm less likely. Rectal swabs here sent for STI though have not yet resulted. Rectal swab from PCP Issac Murphy ([facility] - [phone]) with positive chlamydia. Likely represents infectious proctitis, so no indication for endoscopy unless symptoms do not resolve with medical therapy.     Recommendations:  - No indication for endoscopy at this time  - If his symptoms persist despite medical therapy, would recommend elective outpatient endoscopy at that time  - Agree with ID consult to guide antimicrobial and HAART therapy            Thank you for your consult. I will sign off. Please contact us if you have any additional questions.    Malvin Escoto MD  Gastroenterology  [facility]

## 2023-02-08 NOTE — ASSESSMENT & PLAN NOTE
Patient with 2 week history of bright red blood per rectum with associated tenesmus and rectal pain in setting of HIV infection (reportedly well controlled, CD4 pending) and receptive anal intercourse. Mild fever on admission. History of some intermittent night sweats though no other constitutional symptoms to suggest malignancy. Imaging with likely proctitis (though neoplasm not excluded). Normal colonoscopy in 2021, which makes a neoplasm less likely. Rectal swabs here sent for STI though have not yet resulted. Rectal swab from PCP Issac Murphy ([facility] - [phone]) with positive chlamydia. Likely represents infectious proctitis, so no indication for endoscopy unless symptoms do not resolve with medical therapy.     Recommendations:  - No indication for endoscopy at this time  - If his symptoms persist despite medical therapy, would recommend elective outpatient endoscopy at that time  - Agree with ID consult to guide antimicrobial and HAART therapy

## 2023-02-08 NOTE — PROGRESS NOTES
Nurses Note -- 4 Eyes      2/8/2023   5:02 PM      Skin assessed during: Admit      [x] No Pressure Injuries Present    []Prevention Measures Documented      [] Yes- Altered Skin Integrity Present or Discovered   [] LDA Added if Not in Epic (Describe Wound)   [] New Altered Skin Integrity was Present on Admit and Documented in LDA   [] Wound Image Taken    Wound Care Consulted? No    Attending Nurse:  Urvashi Medeiros RN     Second RN/Staff Member: Raphael Magdaleno LPN

## 2023-02-08 NOTE — PHARMACY MED REC
"Admission Medication History     The home medication history was taken by Kaitlin Moralez.    You may go to "Admission" then "Reconcile Home Medications" tabs to review and/or act upon these items.     The home medication list has been updated by the Pharmacy department.   Please read ALL comments highlighted in yellow.   Please address this information as you see fit.    Feel free to contact us if you have any questions or require assistance.      The medications listed below were removed from the home medication list. Please reorder if appropriate:  Patient reports no longer taking the following medication(s):  ERGOCALCIFEROL 50,000 UNIT  TRAZODONE 50 MG    Medications listed below were obtained from: Patient/family      Current Outpatient Medications on File Prior to Encounter   Medication Sig    acetaminophen (TYLENOL) 500 MG tablet   Take 1,000 mg by mouth 2 (two) times daily as needed (headache).    iadchjvhd-ycplwbej-uuytutm ala (BIKTARVY) -25 mg (25 kg or greater)   Take 1 tablet by mouth once daily.    semaglutide (OZEMPIC) 0.25 mg or 0.5 mg(2 mg/1.5 mL) pen injector Inject 0.5 mg into the skin every Monday.         Kaitlin Moralez  EXT [id number]                  .        "

## 2023-02-08 NOTE — ASSESSMENT & PLAN NOTE
-Cr 1.5 on admit, previous labs show Cr 1.1 suggesting pt. Has prior mild CKD  -Suspect prerenal etiology in setting of proctitis and poor PO intake  -IV fluids ordered, continue to trend

## 2023-02-08 NOTE — SUBJECTIVE & OBJECTIVE
"Past Medical History:   Diagnosis Date    HIV infection        Past Surgical History:   Procedure Laterality Date    COLONOSCOPY N/A 5/26/2021    Procedure: COLONOSCOPY;  Surgeon: Kaleb Magdaleno MD;  Location: [address];  Service: Endoscopy;  Laterality: N/A;  outside referral from [facility] care for Colonoscopy see media tab records -   cOVID test on 5/23/21 at [facility] -     JOINT REPLACEMENT      ankle surgery(L)       Review of patient's allergies indicates:   Allergen Reactions    Sulfamethoxazole-trimethoprim Other (See Comments)     "Headaches and sickness"  "Headaches and sickness"         No current facility-administered medications on file prior to encounter.     Current Outpatient Medications on File Prior to Encounter   Medication Sig    BIKTARVY -25 mg per tablet     ergocalciferol (ERGOCALCIFEROL) 50,000 unit Cap     traZODone (DESYREL) 50 MG tablet Take 50 mg by mouth every evening.     Family History    None       Tobacco Use    Smoking status: Never    Smokeless tobacco: Never   Substance and Sexual Activity    Alcohol use: Yes     Comment: socially     Drug use: Never    Sexual activity: Not on file     Review of Systems   Constitutional:  Positive for chills. Negative for activity change, fever and unexpected weight change.   HENT:  Negative for congestion and sore throat.    Respiratory:  Negative for cough, shortness of breath and wheezing.    Cardiovascular:  Negative for chest pain, palpitations and leg swelling.   Gastrointestinal:  Positive for anal bleeding, blood in stool and rectal pain. Negative for abdominal pain, nausea and vomiting.   Genitourinary:  Negative for dysuria and hematuria.   Musculoskeletal:  Negative for arthralgias and neck pain.   Skin:  Negative for color change and rash.   Neurological:  Negative for dizziness, seizures and numbness.   Psychiatric/Behavioral:  Negative for hallucinations and suicidal ideas.    Objective:     Vital Signs " (Most Recent):  Temp: 98.7 °F (37.1 °C) (02/07/23 2002)  Pulse: 67 (02/07/23 2234)  Resp: 18 (02/07/23 2107)  BP: 137/69 (02/07/23 2234)  SpO2: 99 % (02/07/23 2234) Vital Signs (24h Range):  Temp:  [98.5 °F (36.9 °C)-100.4 °F (38 °C)] 98.7 °F (37.1 °C)  Pulse:  [] 67  Resp:  [18] 18  SpO2:  [98 %-99 %] 99 %  BP: (125-149)/(69-94) 137/69     Weight: 83 kg (183 lb)  Body mass index is 27.02 kg/m².    Physical Exam  Vitals reviewed.   Constitutional:       General: He is not in acute distress.     Appearance: He is well-developed.   HENT:      Head: Normocephalic and atraumatic.   Eyes:      Extraocular Movements: Extraocular movements intact.      Pupils: Pupils are equal, round, and reactive to light.   Neck:      Vascular: No JVD.      Trachea: No tracheal deviation.   Cardiovascular:      Rate and Rhythm: Normal rate and regular rhythm.      Heart sounds: No murmur heard.    No friction rub. No gallop.   Pulmonary:      Effort: No respiratory distress.      Breath sounds: Normal breath sounds. No wheezing or rales.   Abdominal:      General: Bowel sounds are normal. There is no distension.      Palpations: Abdomen is soft. There is no mass.      Tenderness: There is abdominal tenderness.      Comments: Mild LLQ tenderness, no rebound or guarding   Genitourinary:     Comments: No visualized anal fissures or inflammation, tenderness present on rectal exam with some blood, no palpable abscess  Musculoskeletal:         General: No deformity.      Cervical back: Neck supple.   Lymphadenopathy:      Cervical: No cervical adenopathy.   Skin:     General: Skin is warm and dry.      Findings: No rash.   Neurological:      Mental Status: He is alert and oriented to person, place, and time.       CRANIAL NERVES     CN III, IV, VI   Pupils are equal, round, and reactive to light.     Significant Labs: All pertinent labs within the past 24 hours have been reviewed.    Significant Imaging: I have reviewed all pertinent  imaging results/findings within the past 24 hours.

## 2023-02-08 NOTE — HPI
47M with HIV (on Biktarvy with good compliance, reports undetectable viral load though unable to confirm via chart review) here with bright red rectal bleeding and pain x 2 weeks. Reports he first noticed some blood-tinged urination about 2 weeks ago, lasted for a few days and then resolved. Then he began to noticed blood-streaked stools, with bright-red blood. Around this time noticed a significant amount of rectal pain, which has continued. Also noted tenesmus but then was unable to have a BM. Last BM he recalls was about 1.5 weeks ago. Since then he has had continued pain and tenesmus and has been intermittently passing bright red blood with some mixed clots. Seen by his PCP and was swabbed for STIs, which were reportedly negative. Pain and bleeding did not go away so he presented to the ED. Also reports a several month history of infrequent night sweats, with approximately 2 episodes of this in the past 2 weeks. Otherwise he fells well and denies fevers or chills, unintended weight loss. No N/V, preceding diarrhea, dysuria or abdominal pain. He had a colonoscopy in 2021 significant for a small hyperplastic polyp in the sigmoid.     In the ED, he was febrile to 100.4F, HDS. Labs notable for mild PUSHPA with Cr to 1.5. CTA was done which showed no evidence of active extravasation, however was significant for rectal inflammation likely representing proctitis and possible mild sigmoid diverticulitis. He was started on doxycycline and fluid resuscitated. GI was consulted for rectal bleeding.

## 2023-02-08 NOTE — PROGRESS NOTES
Pt arrived to room. Mother at bedside. VSS, NAD noted. Oriented to room and call light. Instructed pt to call for assistance.

## 2023-02-09 VITALS
HEART RATE: 97 BPM | OXYGEN SATURATION: 99 % | HEIGHT: 69 IN | WEIGHT: 183 LBS | TEMPERATURE: 99 F | RESPIRATION RATE: 18 BRPM | BODY MASS INDEX: 27.11 KG/M2 | DIASTOLIC BLOOD PRESSURE: 90 MMHG | SYSTOLIC BLOOD PRESSURE: 144 MMHG

## 2023-02-09 LAB
ANION GAP SERPL CALC-SCNC: 10 MMOL/L (ref 8–16)
BASOPHILS # BLD AUTO: 0.04 K/UL (ref 0–0.2)
BASOPHILS NFR BLD: 0.9 % (ref 0–1.9)
BUN SERPL-MCNC: 6 MG/DL (ref 6–20)
C TRACH RRNA SPEC QL NAA+PROBE: POSITIVE
CALCIUM SERPL-MCNC: 9.6 MG/DL (ref 8.7–10.5)
CHLORIDE SERPL-SCNC: 103 MMOL/L (ref 95–110)
CO2 SERPL-SCNC: 23 MMOL/L (ref 23–29)
CREAT SERPL-MCNC: 1.1 MG/DL (ref 0.5–1.4)
DIFFERENTIAL METHOD: ABNORMAL
EOSINOPHIL # BLD AUTO: 0 K/UL (ref 0–0.5)
EOSINOPHIL NFR BLD: 0.5 % (ref 0–8)
ERYTHROCYTE [DISTWIDTH] IN BLOOD BY AUTOMATED COUNT: 14.4 % (ref 11.5–14.5)
EST. GFR  (NO RACE VARIABLE): >60 ML/MIN/1.73 M^2
GLUCOSE SERPL-MCNC: 81 MG/DL (ref 70–110)
HCT VFR BLD AUTO: 39.2 % (ref 40–54)
HGB BLD-MCNC: 11.8 G/DL (ref 14–18)
IMM GRANULOCYTES # BLD AUTO: 0.01 K/UL (ref 0–0.04)
IMM GRANULOCYTES NFR BLD AUTO: 0.2 % (ref 0–0.5)
LYMPHOCYTES # BLD AUTO: 2.2 K/UL (ref 1–4.8)
LYMPHOCYTES NFR BLD: 50.5 % (ref 18–48)
MAGNESIUM SERPL-MCNC: 2 MG/DL (ref 1.6–2.6)
MCH RBC QN AUTO: 25.2 PG (ref 27–31)
MCHC RBC AUTO-ENTMCNC: 30.1 G/DL (ref 32–36)
MCV RBC AUTO: 84 FL (ref 82–98)
MONOCYTES # BLD AUTO: 0.4 K/UL (ref 0.3–1)
MONOCYTES NFR BLD: 9.5 % (ref 4–15)
N GONORRHOEA RRNA SPEC QL NAA+PROBE: NEGATIVE
N.GONORROHEAE, AMP RNA SOURCE: ABNORMAL
NEUTROPHILS # BLD AUTO: 1.7 K/UL (ref 1.8–7.7)
NEUTROPHILS NFR BLD: 38.4 % (ref 38–73)
NRBC BLD-RTO: 0 /100 WBC
PLATELET # BLD AUTO: 343 K/UL (ref 150–450)
PMV BLD AUTO: 10.9 FL (ref 9.2–12.9)
POTASSIUM SERPL-SCNC: 3.8 MMOL/L (ref 3.5–5.1)
RBC # BLD AUTO: 4.69 M/UL (ref 4.6–6.2)
SODIUM SERPL-SCNC: 136 MMOL/L (ref 136–145)
SPECIMEN SOURCE: ABNORMAL
WBC # BLD AUTO: 4.32 K/UL (ref 3.9–12.7)

## 2023-02-09 PROCEDURE — G0378 HOSPITAL OBSERVATION PER HR: HCPCS

## 2023-02-09 PROCEDURE — 99214 PR OFFICE/OUTPT VISIT, EST, LEVL IV, 30-39 MIN: ICD-10-PCS | Mod: ,,, | Performed by: INTERNAL MEDICINE

## 2023-02-09 PROCEDURE — 99214 OFFICE O/P EST MOD 30 MIN: CPT | Mod: ,,, | Performed by: INTERNAL MEDICINE

## 2023-02-09 PROCEDURE — 36415 COLL VENOUS BLD VENIPUNCTURE: CPT | Performed by: INTERNAL MEDICINE

## 2023-02-09 PROCEDURE — 80048 BASIC METABOLIC PNL TOTAL CA: CPT | Performed by: INTERNAL MEDICINE

## 2023-02-09 PROCEDURE — 25000003 PHARM REV CODE 250: Performed by: INTERNAL MEDICINE

## 2023-02-09 PROCEDURE — 25000003 PHARM REV CODE 250: Performed by: HOSPITALIST

## 2023-02-09 PROCEDURE — 85025 COMPLETE CBC W/AUTO DIFF WBC: CPT | Performed by: INTERNAL MEDICINE

## 2023-02-09 PROCEDURE — 63600175 PHARM REV CODE 636 W HCPCS: Performed by: INTERNAL MEDICINE

## 2023-02-09 PROCEDURE — 96366 THER/PROPH/DIAG IV INF ADDON: CPT

## 2023-02-09 PROCEDURE — 83735 ASSAY OF MAGNESIUM: CPT | Performed by: INTERNAL MEDICINE

## 2023-02-09 PROCEDURE — 99238 PR HOSPITAL DISCHARGE DAY,<30 MIN: ICD-10-PCS | Mod: ,,, | Performed by: INTERNAL MEDICINE

## 2023-02-09 PROCEDURE — 99238 HOSP IP/OBS DSCHRG MGMT 30/<: CPT | Mod: ,,, | Performed by: INTERNAL MEDICINE

## 2023-02-09 RX ORDER — CEFADROXIL 500 MG/1
1000 CAPSULE ORAL EVERY 12 HOURS
Qty: 20 CAPSULE | Refills: 0 | Status: SHIPPED | OUTPATIENT
Start: 2023-02-09 | End: 2023-02-14

## 2023-02-09 RX ORDER — METRONIDAZOLE 500 MG/1
500 TABLET ORAL EVERY 8 HOURS
Qty: 15 TABLET | Refills: 0 | Status: SHIPPED | OUTPATIENT
Start: 2023-02-09 | End: 2023-02-14

## 2023-02-09 RX ORDER — OXYCODONE AND ACETAMINOPHEN 5; 325 MG/1; MG/1
1 TABLET ORAL EVERY 8 HOURS PRN
Qty: 10 TABLET | Refills: 0 | Status: SHIPPED | OUTPATIENT
Start: 2023-02-09

## 2023-02-09 RX ORDER — DOXYCYCLINE 100 MG/1
100 CAPSULE ORAL EVERY 12 HOURS
Qty: 56 CAPSULE | Refills: 0 | Status: SHIPPED | OUTPATIENT
Start: 2023-02-09 | End: 2023-03-09

## 2023-02-09 RX ADMIN — DOXYCYCLINE HYCLATE 100 MG: 100 TABLET, COATED ORAL at 10:02

## 2023-02-09 RX ADMIN — CEFTRIAXONE 2 G: 2 INJECTION, POWDER, FOR SOLUTION INTRAMUSCULAR; INTRAVENOUS at 10:02

## 2023-02-09 RX ADMIN — METRONIDAZOLE 500 MG: 500 TABLET ORAL at 05:02

## 2023-02-09 RX ADMIN — BICTEGRAVIR SODIUM, EMTRICITABINE, AND TENOFOVIR ALAFENAMIDE FUMARATE 1 TABLET: 50; 200; 25 TABLET ORAL at 10:02

## 2023-02-09 NOTE — ASSESSMENT & PLAN NOTE
I have reviewed hospital notes, laboratory, imaging tests, and microbiologic studies from the primary HM service and other specialty providers providers, as indicated for this evaluation, with my interpretation as documented. RPR positive at 1:16. No baseline testing.   · Continue doxycycline 100 mg BID.  · Patient will need to follow up with his established provider for repeat RPR in 6 months.    · Patient has given verbal consent to contact his Provider.

## 2023-02-09 NOTE — PLAN OF CARE
Problem: Adult Inpatient Plan of Care  Goal: Plan of Care Review  Outcome: Ongoing, Progressing     Problem: Adult Inpatient Plan of Care  Goal: Patient-Specific Goal (Individualized)  Outcome: Ongoing, Progressing     Problem: Adult Inpatient Plan of Care  Goal: Optimal Comfort and Wellbeing  Outcome: Ongoing, Progressing     Problem: Fluid and Electrolyte Imbalance (Acute Kidney Injury/Impairment)  Goal: Fluid and Electrolyte Balance  Outcome: Ongoing, Progressing     Problem: Renal Function Impairment (Acute Kidney Injury/Impairment)  Goal: Effective Renal Function  Outcome: Ongoing, Progressing

## 2023-02-09 NOTE — SUBJECTIVE & OBJECTIVE
"Past Medical History:   Diagnosis Date    HIV infection        Past Surgical History:   Procedure Laterality Date    COLONOSCOPY N/A 5/26/2021    Procedure: COLONOSCOPY;  Surgeon: Kaleb Magdaleno MD;  Location: [address]);  Service: Endoscopy;  Laterality: N/A;  outside referral from [facility] care for Colonoscopy see media tab records -   cOVID test on 5/23/21 at [facility]    JOINT REPLACEMENT      ankle surgery(L)       Review of patient's allergies indicates:   Allergen Reactions    Sulfamethoxazole-trimethoprim Other (See Comments)     "Headaches and sickness"  "Headaches and sickness"         Medications:  Medications Prior to Admission   Medication Sig    acetaminophen (TYLENOL) 500 MG tablet Take 1,000 mg by mouth 2 (two) times daily as needed (headache).    ekwcrmqlw-dfpnkgqp-syzrkpj ala (BIKTARVY) -25 mg (25 kg or greater) Take 1 tablet by mouth once daily.    semaglutide (OZEMPIC) 0.25 mg or 0.5 mg(2 mg/1.5 mL) pen injector Inject 0.5 mg into the skin every Monday.     Antibiotics (From admission, onward)      Start     Stop Route Frequency Ordered    02/08/23 1400  metroNIDAZOLE tablet 500 mg         -- Oral Every 8 hours 02/08/23 0940    02/08/23 1045  cefTRIAXone (ROCEPHIN) 2 g in dextrose 5 % in water (D5W) 5 % 50 mL IVPB (MB+)         -- IV Every 24 hours (non-standard times) 02/08/23 0940    02/08/23 0015  doxycycline tablet 100 mg         -- Oral Every 12 hours 02/08/23 0005          Antifungals (From admission, onward)      None          Antivirals (From admission, onward)          Stop Route Frequency     xiuhuqjjx-mqaqokbr-lkjqqux ala         -- Oral Daily               There is no immunization history on file for this patient.    Family History    None       Social History     Socioeconomic History    Marital status: Single   Tobacco Use    Smoking status: Never    Smokeless tobacco: Never   Substance and Sexual Activity    Alcohol use: Yes     Comment: socially     " Drug use: Never     Review of Systems   Constitutional:  Negative for chills, fatigue and fever.   HENT:  Negative for ear pain, mouth sores, nosebleeds, postnasal drip, rhinorrhea, sinus pressure, sore throat, tinnitus, trouble swallowing and voice change.    Eyes:  Negative for photophobia, pain, redness and visual disturbance.   Respiratory:  Negative for apnea, cough, chest tightness, shortness of breath and wheezing.    Cardiovascular:  Negative for chest pain, palpitations and leg swelling.   Gastrointestinal:  Positive for blood in stool and rectal pain. Negative for abdominal pain, constipation, diarrhea, nausea and vomiting.   Endocrine: Negative for cold intolerance, heat intolerance, polydipsia and polyuria.   Genitourinary:  Negative for decreased urine volume, difficulty urinating, dysuria, flank pain, frequency, genital sores, hematuria, penile discharge, penile pain, penile swelling, scrotal swelling, testicular pain and urgency.   Musculoskeletal:  Negative for arthralgias, back pain, joint swelling, myalgias and neck pain.   Skin:  Negative for color change and rash.   Allergic/Immunologic: Negative for environmental allergies and food allergies.   Neurological:  Negative for dizziness, seizures, syncope, weakness, light-headedness, numbness and headaches.   Hematological:  Negative for adenopathy. Does not bruise/bleed easily.   Psychiatric/Behavioral:  Negative for agitation, confusion, decreased concentration, hallucinations, self-injury, sleep disturbance and suicidal ideas. The patient is not nervous/anxious.    Objective:     Vital Signs (Most Recent):  Temp: 100.3 °F (37.9 °C) (02/08/23 1734)  Pulse: 93 (02/08/23 1734)  Resp: 18 (02/08/23 1734)  BP: (!) 145/85 (02/08/23 1734)  SpO2: 98 % (02/08/23 1734)   Vital Signs (24h Range):  Temp:  [98.5 °F (36.9 °C)-100.3 °F (37.9 °C)] 100.3 °F (37.9 °C)  Pulse:  [64-93] 93  Resp:  [18-20] 18  SpO2:  [96 %-100 %] 98 %  BP: (126-151)/(69-95) 145/85      Weight: 83 kg (183 lb)  Body mass index is 27.02 kg/m².    Estimated Creatinine Clearance: 83.9 mL/min (based on SCr of 1.1 mg/dL).    Physical Exam  Vitals reviewed.   Constitutional:       Appearance: He is well-developed.   HENT:      Head: Normocephalic and atraumatic.      Right Ear: External ear normal.      Left Ear: External ear normal.   Eyes:      Conjunctiva/sclera: Conjunctivae normal.   Neck:      Thyroid: No thyromegaly.   Cardiovascular:      Rate and Rhythm: Normal rate and regular rhythm.      Heart sounds: Normal heart sounds. No murmur heard.  Pulmonary:      Effort: Pulmonary effort is normal.      Breath sounds: Normal breath sounds. No wheezing or rales.   Abdominal:      General: Bowel sounds are normal.      Palpations: Abdomen is soft. There is no mass.      Tenderness: There is no abdominal tenderness. There is no rebound.   Musculoskeletal:         General: Normal range of motion.      Cervical back: Normal range of motion and neck supple.   Lymphadenopathy:      Cervical: No cervical adenopathy.   Skin:     General: Skin is warm and dry.   Neurological:      Mental Status: He is alert and oriented to person, place, and time.   Psychiatric:         Behavior: Behavior normal.       Significant Labs: Blood Culture:   Recent Labs   Lab 02/07/23  1728 02/07/23  1743   LABBLOO No Growth to date No Growth to date     BMP:   Recent Labs   Lab 02/08/23  0332         K 4.1      CO2 20*   BUN 10   CREATININE 1.1   CALCIUM 8.5*     CBC:   Recent Labs   Lab 02/07/23  1519 02/08/23  0332 02/08/23  1645   WBC 6.41 4.34  --    HGB 12.3* 10.4* 11.6*   HCT 39.3* 33.8*  --     269  --      HIV 1/2 Antibodies: No results for input(s): [id number] in the last 48 hours.  Urine Culture: No results for input(s): LABURIN in the last 4320 hours.  Urine Studies:   Recent Labs   Lab 02/07/23 2014   COLORU Yellow   APPEARANCEUA Clear   PHUR 6.0   SPECGRAV >1.030*   PROTEINUA Trace*    GLUCUA Negative   KETONESU 1+*   BILIRUBINUA Negative   OCCULTUA Trace*   NITRITE Negative   LEUKOCYTESUR Negative     Lab Results   Component Value Date    RPR Reactive (A) 02/08/2023    PRPQ 1:16 (A) 02/08/2023         Significant Imaging: I have reviewed all pertinent imaging results/findings within the past 24 hours.

## 2023-02-09 NOTE — PLAN OF CARE
Problem: Adult Inpatient Plan of Care  Goal: Patient-Specific Goal (Individualized)  Outcome: Ongoing, Progressing  Goal: Absence of Hospital-Acquired Illness or Injury  Outcome: Ongoing, Progressing  Goal: Optimal Comfort and Wellbeing  Outcome: Ongoing, Progressing  Goal: Readiness for Transition of Care  Outcome: Ongoing, Progressing        Pt resting in bed , in stable condition. Bed in lowest position, wheels locked and call light within reach. Will continue POC and monitoring.

## 2023-02-09 NOTE — SUBJECTIVE & OBJECTIVE
"Interval History: "Better". Chlamydia proctitis with diverticulitis and syphilis. Rectal pain significantly improved after treatment initiated.    Review of Systems   Constitutional:  Negative for chills, fatigue and fever.   HENT:  Negative for ear pain, mouth sores, nosebleeds, postnasal drip, rhinorrhea, sinus pressure, sore throat, tinnitus, trouble swallowing and voice change.    Eyes:  Negative for photophobia, pain, redness and visual disturbance.   Respiratory:  Negative for apnea, cough, chest tightness, shortness of breath and wheezing.    Cardiovascular:  Negative for chest pain, palpitations and leg swelling.   Gastrointestinal:  Positive for blood in stool and rectal pain. Negative for abdominal pain, constipation, diarrhea, nausea and vomiting.   Endocrine: Negative for cold intolerance, heat intolerance, polydipsia and polyuria.   Genitourinary:  Negative for decreased urine volume, difficulty urinating, dysuria, flank pain, frequency, genital sores, hematuria, penile discharge, penile pain, penile swelling, scrotal swelling, testicular pain and urgency.   Musculoskeletal:  Negative for arthralgias, back pain, joint swelling, myalgias and neck pain.   Skin:  Negative for color change and rash.   Allergic/Immunologic: Negative for environmental allergies and food allergies.   Neurological:  Negative for dizziness, seizures, syncope, weakness, light-headedness, numbness and headaches.   Hematological:  Negative for adenopathy. Does not bruise/bleed easily.   Psychiatric/Behavioral:  Negative for agitation, confusion, decreased concentration, hallucinations, self-injury, sleep disturbance and suicidal ideas. The patient is not nervous/anxious.    Objective:     Vital Signs (Most Recent):  Temp: 98.7 °F (37.1 °C) (02/09/23 0340)  Pulse: 85 (02/09/23 0733)  Resp: 18 (02/09/23 0340)  BP: 134/84 (02/09/23 0340)  SpO2: 99 % (02/09/23 0340) Vital Signs (24h Range):  Temp:  [98.6 °F (37 °C)-100.3 °F (37.9 °C)] " 98.7 °F (37.1 °C)  Pulse:  [65-93] 85  Resp:  [18] 18  SpO2:  [97 %-99 %] 99 %  BP: (134-151)/(80-95) 134/84     Weight: 83 kg (183 lb)  Body mass index is 27.02 kg/m².    Estimated Creatinine Clearance: 83.9 mL/min (based on SCr of 1.1 mg/dL).    Physical Exam  Vitals reviewed.   Constitutional:       Appearance: He is well-developed.   HENT:      Head: Normocephalic and atraumatic.      Right Ear: External ear normal.      Left Ear: External ear normal.   Eyes:      Conjunctiva/sclera: Conjunctivae normal.   Neck:      Thyroid: No thyromegaly.   Cardiovascular:      Rate and Rhythm: Normal rate and regular rhythm.      Heart sounds: Normal heart sounds. No murmur heard.  Pulmonary:      Effort: Pulmonary effort is normal.      Breath sounds: Normal breath sounds. No wheezing or rales.   Abdominal:      General: Bowel sounds are normal.      Palpations: Abdomen is soft. There is no mass.      Tenderness: There is no abdominal tenderness. There is no rebound.   Musculoskeletal:         General: Normal range of motion.      Cervical back: Normal range of motion and neck supple.   Lymphadenopathy:      Cervical: No cervical adenopathy.   Skin:     General: Skin is warm and dry.   Neurological:      Mental Status: He is alert and oriented to person, place, and time.   Psychiatric:         Behavior: Behavior normal.       Significant Labs: Blood Culture:   Recent Labs   Lab 02/07/23  1728 02/07/23  1743   LABBLOO No Growth to date  No Growth to date No Growth to date  No Growth to date     BMP:   Recent Labs   Lab 02/09/23  0719   GLU 81      K 3.8      CO2 23   BUN 6   CREATININE 1.1   CALCIUM 9.6   MG 2.0     CBC:   Recent Labs   Lab 02/07/23  1519 02/08/23  0332 02/08/23  1645 02/09/23  0719   WBC 6.41 4.34  --  4.32   HGB 12.3* 10.4* 11.6* 11.8*   HCT 39.3* 33.8*  --  39.2*    269  --  343     Hepatitis Panel:   Recent Labs   Lab 02/07/23  1519   HEPCAB Reactive*     HIV 1/2 Antibodies: No  results for input(s): [id number] in the last 48 hours.  Lab Results   Component Value Date    RPR Reactive (A) 02/08/2023    PRPQ 1:16 (A) 02/08/2023         Significant Imaging: I have reviewed all pertinent imaging results/findings within the past 24 hours.

## 2023-02-09 NOTE — PROGRESS NOTES
Brief update:      Patient reports pain and discharge are improving.      Clinically unremarkable heart, pulmonary, abdominal exam.  Patient alert and fully interactive.      Consults from Infectious Disease and GI reviewed and orders are in place.    Billed by another provider

## 2023-02-09 NOTE — PROGRESS NOTES
[facility]  Infectious Disease  Progress Note    Patient Name: Jason Coy  MRN: [id number]  Admission Date: 2/7/2023  Length of Stay: 0 days  Attending Physician: Key Earl MD  Primary Care Provider: Primary Doctor No    Isolation Status: No active isolations  Assessment/Plan:      * Chlamydial proctitis  Proctitis confirmed on imaging.  Examination did not reveal any evidence of ulcerations however patient does report secretions and bleeding.  Differential includes proctitis secondary to chlamydia, gonorrhea, and lymphogranuloma venereum among others. During my evaluation the patient received a call from his outpatient provider who reported a positive rectal chlamydia test.   · Will plan to transition to cefadroxil 1 gm BID plus metronidazole 500 mg PO TID for 5 days for discharge.  · Continue doxycycline 100 mg BID for 4 weeks.    Syphilis  I have reviewed hospital notes, laboratory, imaging tests, and microbiologic studies from the primary  service and other specialty providers providers, as indicated for this evaluation, with my interpretation as documented. RPR positive at 1:16. No baseline testing.   · Continue doxycycline 100 mg BID.  · Patient will need to follow up with his established provider for repeat RPR in 6 months.    · Patient has given verbal consent to contact his Provider.       HIV disease  Chronic and stable.  · Continue Biktarvy.   · Outpatient follow up with his provider.      Anticipated Disposition: per primary    Thank you for your consult. I will sign off. Please contact us if you have any additional questions.    Alison Mendoza MD  Infectious Disease  [facility]    Subjective:     Principal Problem:Chlamydial proctitis    HPI: A 47-year-old man with HIV virologically suppressed and on ART with Biktarvy whom 2 weeks prior to admission developed progressively worsening rectal pain. This was associated with urgency, tenesmus and rectal bleeding. His  "pain was made work with micturition and bowel movements. He was evaluated by his HIV provider, Issac Murphy NP, with negative urine for GC/Chlamydia. He sought care in [facility] ED due to severe rectal pain. On evaluation he was afebrile. Examination was reported to have rectal pain on palpation with bleeding however no ulcers noted. Laboratory work up revealed an elevated creatinine from baseline and imaging was consistent with diverticulitis plus proctitis. He was admitted to Observation under  service and started on Zosyn and doxycycline.    Mr. Coy is intolerant of Bactrim. He has anal receptive sex with men and uses condoms consistently however he does report one encounter where barrier protection broke.     Infectious Diseases consulted for "HIV patient on HARRT with proctitis, sexually active with men"        Interval History: "Better". Chlamydia proctitis with diverticulitis and syphilis. Rectal pain significantly improved after treatment initiated.    Review of Systems   Constitutional:  Negative for chills, fatigue and fever.   HENT:  Negative for ear pain, mouth sores, nosebleeds, postnasal drip, rhinorrhea, sinus pressure, sore throat, tinnitus, trouble swallowing and voice change.    Eyes:  Negative for photophobia, pain, redness and visual disturbance.   Respiratory:  Negative for apnea, cough, chest tightness, shortness of breath and wheezing.    Cardiovascular:  Negative for chest pain, palpitations and leg swelling.   Gastrointestinal:  Positive for blood in stool and rectal pain. Negative for abdominal pain, constipation, diarrhea, nausea and vomiting.   Endocrine: Negative for cold intolerance, heat intolerance, polydipsia and polyuria.   Genitourinary:  Negative for decreased urine volume, difficulty urinating, dysuria, flank pain, frequency, genital sores, hematuria, penile discharge, penile pain, penile swelling, scrotal swelling, testicular pain and urgency.   Musculoskeletal:  Negative for " arthralgias, back pain, joint swelling, myalgias and neck pain.   Skin:  Negative for color change and rash.   Allergic/Immunologic: Negative for environmental allergies and food allergies.   Neurological:  Negative for dizziness, seizures, syncope, weakness, light-headedness, numbness and headaches.   Hematological:  Negative for adenopathy. Does not bruise/bleed easily.   Psychiatric/Behavioral:  Negative for agitation, confusion, decreased concentration, hallucinations, self-injury, sleep disturbance and suicidal ideas. The patient is not nervous/anxious.    Objective:     Vital Signs (Most Recent):  Temp: 98.7 °F (37.1 °C) (02/09/23 0340)  Pulse: 85 (02/09/23 0733)  Resp: 18 (02/09/23 0340)  BP: 134/84 (02/09/23 0340)  SpO2: 99 % (02/09/23 0340) Vital Signs (24h Range):  Temp:  [98.6 °F (37 °C)-100.3 °F (37.9 °C)] 98.7 °F (37.1 °C)  Pulse:  [65-93] 85  Resp:  [18] 18  SpO2:  [97 %-99 %] 99 %  BP: (134-151)/(80-95) 134/84     Weight: 83 kg (183 lb)  Body mass index is 27.02 kg/m².    Estimated Creatinine Clearance: 83.9 mL/min (based on SCr of 1.1 mg/dL).    Physical Exam  Vitals reviewed.   Constitutional:       Appearance: He is well-developed.   HENT:      Head: Normocephalic and atraumatic.      Right Ear: External ear normal.      Left Ear: External ear normal.   Eyes:      Conjunctiva/sclera: Conjunctivae normal.   Neck:      Thyroid: No thyromegaly.   Cardiovascular:      Rate and Rhythm: Normal rate and regular rhythm.      Heart sounds: Normal heart sounds. No murmur heard.  Pulmonary:      Effort: Pulmonary effort is normal.      Breath sounds: Normal breath sounds. No wheezing or rales.   Abdominal:      General: Bowel sounds are normal.      Palpations: Abdomen is soft. There is no mass.      Tenderness: There is no abdominal tenderness. There is no rebound.   Musculoskeletal:         General: Normal range of motion.      Cervical back: Normal range of motion and neck supple.   Lymphadenopathy:       Cervical: No cervical adenopathy.   Skin:     General: Skin is warm and dry.   Neurological:      Mental Status: He is alert and oriented to person, place, and time.   Psychiatric:         Behavior: Behavior normal.       Significant Labs: Blood Culture:   Recent Labs   Lab 02/07/23  1728 02/07/23  1743   LABBLOO No Growth to date  No Growth to date No Growth to date  No Growth to date     BMP:   Recent Labs   Lab 02/09/23  0719   GLU 81      K 3.8      CO2 23   BUN 6   CREATININE 1.1   CALCIUM 9.6   MG 2.0     CBC:   Recent Labs   Lab 02/07/23  1519 02/08/23  0332 02/08/23  1645 02/09/23  0719   WBC 6.41 4.34  --  4.32   HGB 12.3* 10.4* 11.6* 11.8*   HCT 39.3* 33.8*  --  39.2*    269  --  343     Hepatitis Panel:   Recent Labs   Lab 02/07/23  1519   HEPCAB Reactive*     HIV 1/2 Antibodies: No results for input(s): [id number] in the last 48 hours.  Lab Results   Component Value Date    RPR Reactive (A) 02/08/2023    PRPQ 1:16 (A) 02/08/2023         Significant Imaging: I have reviewed all pertinent imaging results/findings within the past 24 hours.

## 2023-02-09 NOTE — PLAN OF CARE
[facility]  Discharge Final Note    Primary Care Provider: Issac Murphy NP    Expected Discharge Date: 2/9/2023    Final Discharge Note (most recent)       Final Note - 02/09/23 1300          Final Note    Assessment Type Final Discharge Note     Anticipated Discharge Disposition Home or Self Care     What phone number can be called within the next 1-3 days to see how you are doing after discharge? [phone]     Hospital Resources/Appts/Education Provided Appointments scheduled and added to AVS        Post-Acute Status    Post-Acute Authorization Other     Other Status No Post-Acute Service Needs     Discharge Delays None known at this time                     Important Message from Medicare             Contact Info       Issac Murphy NP   Specialty: Family Medicine   Relationship: PCP - General    [id number]  [address]  [place] [id number]   Phone: [phone]       Next Steps: Follow up

## 2023-02-09 NOTE — ASSESSMENT & PLAN NOTE
Proctitis confirmed on imaging.  Examination did not reveal any evidence of ulcerations however patient does report secretions and bleeding.  Differential includes proctitis secondary to chlamydia, gonorrhea, and lymphogranuloma venereum among others. During my evaluation the patient received a call from his outpatient provider who reported a positive rectal chlamydia test.   · Will plan to transition to cefadroxil 1 gm BID plus metronidazole 500 mg PO TID for 5 days for discharge.  · Continue doxycycline 100 mg BID for 4 weeks.

## 2023-02-09 NOTE — HPI
"A 47-year-old man with HIV virologically suppressed and on ART with Biktarvy whom 2 weeks prior to admission developed progressively worsening rectal pain. This was associated with urgency, tenesmus and rectal bleeding. His pain was made work with micturition and bowel movements. He was evaluated by his HIV provider, Issac Murphy NP, with negative urine for GC/Chlamydia. He sought care in [facility] [place] ED due to severe rectal pain. On evaluation he was afebrile. Examination was reported to have rectal pain on palpation with bleeding however no ulcers noted. Laboratory work up revealed an elevated creatinine from baseline and imaging was consistent with diverticulitis plus proctitis. He was admitted to Observation under  service and started on Zosyn and doxycycline.    Mr. Coy is intolerant of Bactrim. He has anal receptive sex with men and uses condoms consistently however he does report one encounter where barrier protection broke.     Infectious Diseases consulted for "HIV patient on HARRT with proctitis, sexually active with men"      "

## 2023-02-09 NOTE — DISCHARGE SUMMARY
[facility] Medicine  Discharge Summary      Patient Name: Jason Coy  MRN: [id number]  Admission Date: 2/7/2023  Hospital Length of Stay: 0 days  Discharge Date and Time:  02/09/2023 9:10 AM  Attending Physician: Key Earl MD   Discharging Provider: Key Earl MD  Primary Care Provider: Primary Doctor No        HPI:   48 yo M with PMhx HIV on biktarvy who presents to the ED complaining of rectal bleeding and rectal/anal pain x ~ 2 weeks. Pt. Reports he started having small amounts of rectal bleeding as well as tenesmus/urgency. The symptoms have progressively worsened over the last week with increased amounts of recal bleeding and anal discharge. He states he is afraid to eat because it will trigger cramping or a BM that would make his pain rectal worse. He denies any fevers, chills, nausea, or vomiting. He has had some mild LLQ lower abdominal pain/cramping. Pt. Is sexually active  with men and has receptive anal intercourse. He saw his PCP for these symptoms and reports that he testted negative for GC/CT and syphilis at that time, but he was not prescribed any medications or abx. He reports compliance with his biktarvy medication with undetectable viral load on his last labs.    Procedure(s) (LRB):  SIGMOIDOSCOPY, FLEXIBLE (N/A)      Hospital Course:     Patient was found to have anal proctitis/possible diverticulitis in the setting of a chlamydial section in addition to an active syphilis infection.  He was seen by the Infectious Disease Service and antibiotics have been recommended as below.  He was seen by the GI service and they have recommended outpatient follow-up given the abnormal imaging and need to rule out malignancy once the infection is treated..    Also on admission he was found to have an PUSHPA.  Fluids were administered leading to resolution of PUSHPA.  Oral intake has normalized.      * Acute Proctitis 2/2 chlamydia  --cefadroxil/flagyl x 5 days  -Outpatient ID  referral/GI referral  -pain and discharge significantly improved by the time of discharge     PUSHPA (acute kidney injury)  -Cr 1.5 on admit, previous labs show Cr 1.1 suggesting pt. Has prior mild CKD  -Suspect prerenal etiology in setting of proctitis and poor PO intake  -IV fluids ordered; which led to resolution         HIV disease  -Pt. Reports negative viral load at last lab testing outpatient, compliant with biktarvy. Continue home biktarvy,    Syphilis  -Doxy 100 bid x 4 weeks with ID referral as outpatient     Consults:   Consults (From admission, onward)          Status Ordering Provider     Inpatient consult to Infectious Diseases  Once        Provider:  (Not yet assigned)    Completed TATUM BUSTAMANTE     Inpatient consult to Gastroenterology  Once        Provider:  (Not yet assigned)    Completed RISA SNA            Final Active Diagnoses:    Diagnosis Date Noted POA    PRINCIPAL PROBLEM:  Chlamydial proctitis [A56.3] 02/08/2023 Yes    HIV disease [B20] 02/08/2023 Yes     Chronic    PUSHPA (acute kidney injury) [N17.9] 02/08/2023 Yes    Diverticulitis [K57.92] 02/08/2023 Yes    Syphilis [A53.9] 02/08/2023 Yes      Problems Resolved During this Admission:      Discharged Condition: stable    Disposition: Home or Self Care    Follow Up:    Patient Instructions:      Ambulatory referral/consult to Gastroenterology   Standing Status: Future   Referral Priority: Routine Referral Type: Consultation   Referral Reason: Specialty Services Required   Requested Specialty: Gastroenterology   Number of Visits Requested: 1     Ambulatory referral/consult to Infectious Disease   Standing Status: Future   Referral Priority: Routine Referral Type: Consultation   Referral Reason: Specialty Services Required   Requested Specialty: Infectious Diseases   Number of Visits Requested: 1     Notify your health care provider if you experience any of the following:  temperature >100.4     Notify your health care provider if you  experience any of the following:  persistent nausea and vomiting or diarrhea     Notify your health care provider if you experience any of the following:  severe uncontrolled pain     Notify your health care provider if you experience any of the following:  difficulty breathing or increased cough     Notify your health care provider if you experience any of the following:  severe persistent headache     Notify your health care provider if you experience any of the following:  worsening rash     Notify your health care provider if you experience any of the following:  persistent dizziness, light-headedness, or visual disturbances     Notify your health care provider if you experience any of the following:  increased confusion or weakness     Notify your health care provider if you experience any of the following:     Medications:  Reconciled Home Medications:      Medication List        START taking these medications      cefadroxil 500 MG Cap  Commonly known as: DURICEF  Take 2 capsules (1,000 mg total) by mouth every 12 (twelve) hours. for 5 days     doxycycline 100 MG Cap  Commonly known as: VIBRAMYCIN  Take 1 capsule (100 mg total) by mouth every 12 (twelve) hours.     metroNIDAZOLE 500 MG tablet  Commonly known as: FLAGYL  Take 1 tablet (500 mg total) by mouth every 8 (eight) hours. for 5 days     oxyCODONE-acetaminophen 5-325 mg per tablet  Commonly known as: PERCOCET  Take 1 tablet by mouth every 8 (eight) hours as needed for Pain.            CONTINUE taking these medications      acetaminophen 500 MG tablet  Commonly known as: TYLENOL  Take 1,000 mg by mouth 2 (two) times daily as needed (headache).     ahysfbtng-udbdxzfj-wkyiwmq ala -25 mg (25 kg or greater)  Commonly known as: BIKTARVY  Take 1 tablet by mouth once daily.     semaglutide 0.25 mg or 0.5 mg(2 mg/1.5 mL) pen injector  Commonly known as: OZEMPIC  Inject 0.5 mg into the skin every Monday.            Pending Diagnostic Studies:       None           Indwelling Lines/Drains at time of discharge:   Lines/Drains/Airways       None                   Time spent on the discharge of patient: 35 minutes         Key Earl MD  Department of Hospital Medicine  [facility]

## 2023-02-09 NOTE — ASSESSMENT & PLAN NOTE
I have reviewed hospital notes, laboratory, imaging tests, and microbiologic studies from the primary HM service and other specialty providers providers, as indicated for this evaluation, with my interpretation as documented. RPR positive at 1:16. No baseline testing.   · Continue doxycycline 100 mg BID.  · Will plan for a 4 week course.  · Patient will need to follow up with his established provider for repeat RPR in 6 months.

## 2023-02-10 LAB — T PALLIDUM AB SER QL IF: REACTIVE

## 2023-02-12 LAB
BACTERIA BLD CULT: NORMAL
BACTERIA BLD CULT: NORMAL

## 2023-05-15 PROBLEM — N17.9 AKI (ACUTE KIDNEY INJURY): Status: RESOLVED | Noted: 2023-02-08 | Resolved: 2023-05-15

## 2025-04-04 ENCOUNTER — TELEPHONE (OUTPATIENT)
Dept: HEPATOLOGY | Facility: CLINIC | Age: 50
End: 2025-04-04
Payer: COMMERCIAL

## 2025-04-04 ENCOUNTER — OFFICE VISIT (OUTPATIENT)
Dept: HEPATOLOGY | Facility: CLINIC | Age: 50
End: 2025-04-04
Payer: COMMERCIAL

## 2025-04-04 VITALS — WEIGHT: 212.75 LBS | BODY MASS INDEX: 31.51 KG/M2 | HEIGHT: 69 IN

## 2025-04-04 DIAGNOSIS — B18.2 CHRONIC HEPATITIS C WITHOUT HEPATIC COMA: Primary | ICD-10-CM

## 2025-04-04 DIAGNOSIS — Z21 HIV INFECTION, UNSPECIFIED SYMPTOM STATUS: ICD-10-CM

## 2025-04-04 PROCEDURE — 99999 PR PBB SHADOW E&M-EST. PATIENT-LVL III: CPT | Mod: PBBFAC,,, | Performed by: PHYSICIAN ASSISTANT

## 2025-04-04 RX ORDER — ESCITALOPRAM OXALATE 10 MG/1
1 TABLET ORAL DAILY
COMMUNITY

## 2025-04-04 RX ORDER — LEVOCETIRIZINE DIHYDROCHLORIDE 5 MG/1
1 TABLET, FILM COATED ORAL DAILY
COMMUNITY

## 2025-04-04 RX ORDER — AMLODIPINE BESYLATE 10 MG/1
1 TABLET ORAL DAILY
COMMUNITY
Start: 2025-03-25

## 2025-04-04 NOTE — PROGRESS NOTES
"HEPATOLOGY CLINIC VISIT NOTE - HCV clinic  OUTSIDE REFERRING PROVIDER: Argentina Roberson NP  CHIEF COMPLAINT: Hepatitis C   (accompanied by: mother)    HISTORY       This is a 50 y.o. Black or  male with HIV, on Biktarvy, referred for HCV    HCV history:  Recalls being told something about "hepatitis" in years past but then told everything was fine. Ultimately never treated and has had no prior hepatology care.  Recent labs after re-establishing care w/ PCP revealed (+) HCV  Prior icteric illnesses: None    - Prior Treatment: No  - Genotype ?    RNA titer low and dropping:  (3/11/25) HCV  --> (3/25/25) HCV RNA 20      Liver staging:  FibroSURE 3/25/25: A0, F0  No liver imaging  Labs - no evidence of liver dysfunction      Denies jaundice, dark urine, abdominal distention, hematemesis, melena, slowed mentation.     PMH, PSH, SOCIAL HX, FAMILY HX      Reviewed in Epic  Pertinent findings:  FAMILY HX: neg for liver diease  SOCIAL HX: resides locally  Alcohol - ~3 drinks on weekends  Drugs - no      ROS: as per HPI    PHYSICAL EXAM:  Friendly Black or  male, in no acute distress; alert and oriented to person, place and time  HEENT: Sclerae anicteric.   NECK: Supple  LUNGS: Normal respiratory effort.   ABDOMEN: Flat, soft, nontender.   SKIN: Warm and dry. No jaundice, No obvious rashes. (+) tattoos  NEURO/PSYCH: Normal gate. Memory intact. Thought and speech pattern appropriate. Behavior normal. No depression or anxiety noted.    PERTINENT DIAGNOSTIC RESULTS      Lab Results   Component Value Date    WBC 4.32 02/09/2023    HGB 11.8 (L) 02/09/2023     02/09/2023     No results found for: "INR"  Lab Results   Component Value Date    AST 15 02/08/2023    ALT 8 (L) 02/08/2023    BILITOT 0.5 02/08/2023    ALBUMIN 2.8 (L) 02/08/2023    ALKPHOS 59 02/08/2023    CREATININE 1.1 02/09/2023    BUN 6 02/09/2023     02/09/2023    K 3.8 02/09/2023     Outside labs:   3/11/25  WBC 5.7, " HGB 11.7,   BUN 11, CREATININE 1.3, , K 4.2, ALBUMIN 3.8, ALKPHOS 107, TOTBILI 0.6, AST 32, ALT 22  HIV RNA <20  HBsAg neg  HBsAb reactive  HBcAb pos  HbA1c 5.8  CD4 681    3/25/25  HBeAg neg  HBeAb pos  HAVAB total - pos     ASSESSMENT        50 y.o. Black or  male with:  1. HCV: recent diagnosis, very low and declining viral titer   -- ?clearing on his own  -- Prior HCV treatment - No  -- Transaminases - normal  -- HAV status - (+) immunity, documented 3/2025    2. Prior HBV exposure / (+) HBcAb  -- risk of reactivation with immunosuppressant therapy / high dose steroids     3. HIV  -- On Biktarvy      PLAN        Labs late April  Ultrasound  FibroScan    Further recs to follow    Orders Placed This Encounter   Procedures    FibroScan Transplant Hepatology(Vibration Controlled Transient Elastography)    US Abdomen Limited    Hepatitis C RNA, Quantitative, PCR    Hepatic Function Panel         ___________________________________________________________________  EDUCATION:  The natural history of Hepatitis C, including potential progression to cirrhosis was reviewed.     Transmission of Hepatitis C was reviewed, including possible sexual transmission.     Recommend avoiding raw seafood.  Limit acetaminophen to 2000mg daily.    HBcAB (+)  Labs reveal prior HBV exposure. Discussed risk of HBV reactivation if immunosuppressant medicines, immune modulating medicines, or high dose steroids are ever needed. In this setting monitoring or prophylactic HBV therapy would be needed.   __________________________________________________________________    Duration of encounter: 38min  This includes face-to-face time and non face-to-face time preparing to see the patient (eg, review of tests), obtaining and/or reviewing separately obtained history, documenting clinical information in the electronic or other health record, independently interpreting resultsand communicating results to the  patient/family/caregiver, or care coordination.

## 2025-04-04 NOTE — TELEPHONE ENCOUNTER
CARLEY Zhu ordered that patient be scheduled for a hepatology consult visit for hep c.  Clinicals imported into media.  I spoke with patient.  Appt with PA Scheuermann scheduled 4/4/25.  Contact info updated.

## 2025-04-08 ENCOUNTER — TELEPHONE (OUTPATIENT)
Facility: CLINIC | Age: 50
End: 2025-04-08
Payer: COMMERCIAL

## 2025-04-08 NOTE — TELEPHONE ENCOUNTER
----- Message from Jennifer Scheuermann, PA sent at 4/4/2025  4:27 PM CDT -----  Pls cancel 4/11 labs and r/s to be done 4/25.  Thanks (pt already aware)

## 2025-04-14 ENCOUNTER — PROCEDURE VISIT (OUTPATIENT)
Dept: HEPATOLOGY | Facility: CLINIC | Age: 50
End: 2025-04-14
Payer: COMMERCIAL

## 2025-04-14 ENCOUNTER — TELEPHONE (OUTPATIENT)
Dept: HEPATOLOGY | Facility: CLINIC | Age: 50
End: 2025-04-14

## 2025-04-14 ENCOUNTER — HOSPITAL ENCOUNTER (OUTPATIENT)
Dept: RADIOLOGY | Facility: HOSPITAL | Age: 50
Discharge: HOME | End: 2025-04-14
Attending: PHYSICIAN ASSISTANT
Payer: COMMERCIAL

## 2025-04-14 DIAGNOSIS — B18.2 CHRONIC HEPATITIS C WITHOUT HEPATIC COMA: ICD-10-CM

## 2025-04-14 PROCEDURE — 76705 ECHO EXAM OF ABDOMEN: CPT | Mod: TC

## 2025-04-14 PROCEDURE — 76705 ECHO EXAM OF ABDOMEN: CPT | Mod: 26,,, | Performed by: RADIOLOGY

## 2025-04-14 PROCEDURE — 91200 LIVER ELASTOGRAPHY: CPT | Mod: S$GLB,,, | Performed by: PHYSICIAN ASSISTANT

## 2025-04-14 NOTE — PROCEDURES
FibroScan Other Locations    Date/Time: 4/14/2025 11:00 AM    Performed by: Scheuermann, Jennifer B., PA  Authorized by: Scheuermann, Jennifer B., PA    Diagnosis:  HCV    Probe:  M    Universal Protocol: Patient's identity, procedure and site were verified, confirmatory pause was performed.  Discussed procedure including risks and potential complications.  Questions answered.  Patient verbalizes understanding and wishes to proceed with VCTE.     Procedure: After providing explanations of the procedure, patient was placed in the supine position with right arm in maximum abduction to allow optimal exposure of right lateral abdomen.  Patient was briefly assessed, Testing was performed in the mid-axillary location, 50Hz Shear Wave pulses were applied and the resulting Shear Wave and Propagation Speed detected with a 3.5 MHz ultrasonic signal, using the FibroScan probe, Skin to liver capsule distance and liver parenchyma were accessed during the entire examination with the FibroScan probe, Patient was instructed to breathe normally and to abstain from sudden movements during the procedure, allowing for random measurements of liver stiffness. At least 10 Shear Waves were produced, Individual measurements of each Shear Wave were calculated.  Patient tolerated the procedure well with no complications.  Meets discharge criteria as was dismissed.  Rates pain 0 out of 10.  Patient will follow up with ordering provider to review results.    Findings  Median liver stiffness score:  6  CAP Reading: dB/m:  184    IQR/med %:  11  Interpretation  Fibrosis interpretation is based on medial liver stiffness - Kilopascal (kPa).    Fibrosis Stage:  F 0-1  Steatosis interpretation is based on controlled attenuation parameter - (dB/m).    Steatosis Grade:  <S1

## 2025-04-14 NOTE — TELEPHONE ENCOUNTER
----- Message from Jennifer Scheuermann, PA sent at 4/14/2025  2:14 PM CDT -----  Pls call pt:  1. Fibroscan looks fine. No evidence of any significant scarring in the liver. (His liver is at Stage 0-1. The scale goes 0 to 4).    2. U/S shows a small spot on his liver. At this point I am not certain if it is something concerning or not. I recommend an MRI to evaluate this more closely.  Schedule MRI and AFP    3. Keep lab appt to monitor Hep C virus (should be scheduled at end of month)

## 2025-04-14 NOTE — TELEPHONE ENCOUNTER
I spoke with patient and msg from PA Scheuermann relayed.  AFP scheduled 4/19 and MRI scheduled 4/23.  I stressed that patient fast 4 hours before MRI.

## 2025-04-19 ENCOUNTER — LAB VISIT (OUTPATIENT)
Dept: LAB | Facility: HOSPITAL | Age: 50
End: 2025-04-19
Payer: COMMERCIAL

## 2025-04-19 DIAGNOSIS — K76.9 LIVER LESION: ICD-10-CM

## 2025-04-19 DIAGNOSIS — B18.2 CHRONIC HEPATITIS C WITHOUT HEPATIC COMA: ICD-10-CM

## 2025-04-19 LAB
AFP SERPL-MCNC: 3.4 NG/ML
ALBUMIN SERPL BCP-MCNC: 3.3 G/DL (ref 3.5–5.2)
ALP SERPL-CCNC: 98 UNIT/L (ref 40–150)
ALT SERPL W/O P-5'-P-CCNC: 18 UNIT/L (ref 10–44)
AST SERPL-CCNC: 23 UNIT/L (ref 11–45)
BILIRUB DIRECT SERPL-MCNC: 0.2 MG/DL (ref 0.1–0.3)
BILIRUB SERPL-MCNC: 0.4 MG/DL (ref 0.1–1)
PROT SERPL-MCNC: 8.3 GM/DL (ref 6–8.4)

## 2025-04-19 PROCEDURE — 82105 ALPHA-FETOPROTEIN SERUM: CPT

## 2025-04-19 PROCEDURE — 87522 HEPATITIS C REVRS TRNSCRPJ: CPT

## 2025-04-19 PROCEDURE — 36415 COLL VENOUS BLD VENIPUNCTURE: CPT

## 2025-04-19 PROCEDURE — 80076 HEPATIC FUNCTION PANEL: CPT

## 2025-04-21 ENCOUNTER — RESULTS FOLLOW-UP (OUTPATIENT)
Dept: HEPATOLOGY | Facility: CLINIC | Age: 50
End: 2025-04-21

## 2025-04-21 ENCOUNTER — TELEPHONE (OUTPATIENT)
Dept: HEPATOLOGY | Facility: CLINIC | Age: 50
End: 2025-04-21
Payer: COMMERCIAL

## 2025-04-21 DIAGNOSIS — B18.2 CHRONIC HEPATITIS C WITHOUT HEPATIC COMA: Primary | ICD-10-CM

## 2025-04-21 LAB
HCV RNA SERPL NAA+PROBE-ACNC: <12 IU/ML
HCV RNA SERPL NAA+PROBE-LOG IU: DETECTED {LOG_IU}/ML

## 2025-04-21 NOTE — TELEPHONE ENCOUNTER
4/19 labs:  LFT normal  AFP 3.4  HCV <12, detected    Pls tell pt:  Liver cancer screening lab is normal  Keep MRI appt later this week  Liver labs normal  Hep C virus registering as < 12. Suggests his body is in process of clearing on own w/o treatment. We'll continue monitoring    Schedule LFT and HCV RNA in 6 wks

## 2025-04-22 NOTE — TELEPHONE ENCOUNTER
I spoke with patient and msg from PA Scheuermann relayed.  Lab draw scheduled 6/2/25; appt reminder notice mailed.  It was stressed that lab draw at the end of this week was cancelled.

## 2025-04-23 ENCOUNTER — HOSPITAL ENCOUNTER (OUTPATIENT)
Dept: RADIOLOGY | Facility: HOSPITAL | Age: 50
Discharge: HOME | End: 2025-04-23
Attending: PHYSICIAN ASSISTANT
Payer: COMMERCIAL

## 2025-04-23 DIAGNOSIS — K76.9 LIVER LESION: ICD-10-CM

## 2025-04-23 PROCEDURE — 74183 MRI ABD W/O CNTR FLWD CNTR: CPT | Mod: TC

## 2025-04-23 PROCEDURE — 25500020 PHARM REV CODE 255: Performed by: PHYSICIAN ASSISTANT

## 2025-04-23 PROCEDURE — A9585 GADOBUTROL INJECTION: HCPCS | Performed by: PHYSICIAN ASSISTANT

## 2025-04-23 PROCEDURE — 74183 MRI ABD W/O CNTR FLWD CNTR: CPT | Mod: 26,,, | Performed by: RADIOLOGY

## 2025-04-23 RX ORDER — GADOBUTROL 604.72 MG/ML
10 INJECTION INTRAVENOUS
Status: COMPLETED | OUTPATIENT
Start: 2025-04-23 | End: 2025-04-23

## 2025-04-23 RX ADMIN — GADOBUTROL 10 ML: 604.72 INJECTION INTRAVENOUS at 06:04

## 2025-04-29 ENCOUNTER — PATIENT MESSAGE (OUTPATIENT)
Dept: GASTROENTEROLOGY | Facility: CLINIC | Age: 50
End: 2025-04-29
Payer: COMMERCIAL

## 2025-04-29 ENCOUNTER — TELEPHONE (OUTPATIENT)
Dept: HEPATOLOGY | Facility: CLINIC | Age: 50
End: 2025-04-29
Payer: COMMERCIAL

## 2025-04-29 ENCOUNTER — TELEPHONE (OUTPATIENT)
Dept: ENDOSCOPY | Facility: HOSPITAL | Age: 50
End: 2025-04-29
Payer: COMMERCIAL

## 2025-04-29 DIAGNOSIS — K86.2 PANCREATIC CYST: Primary | ICD-10-CM

## 2025-04-29 NOTE — TELEPHONE ENCOUNTER
4/23 MRI d/w pt:  Liver lesion c/w hemangioma. No further eval needed.  0.4cm cystic lesion in pancreatic head ?side branch IPMN    Will send to Panc Cyst clinic for further eval / surveillance as he does not require long term liver monitoring after HCV cured / resolved

## 2025-04-30 ENCOUNTER — PATIENT MESSAGE (OUTPATIENT)
Dept: GASTROENTEROLOGY | Facility: CLINIC | Age: 50
End: 2025-04-30
Payer: COMMERCIAL

## 2025-05-14 ENCOUNTER — OFFICE VISIT (OUTPATIENT)
Dept: GASTROENTEROLOGY | Facility: CLINIC | Age: 50
End: 2025-05-14
Payer: COMMERCIAL

## 2025-05-14 VITALS — HEIGHT: 69 IN | BODY MASS INDEX: 31.84 KG/M2 | WEIGHT: 214.94 LBS

## 2025-05-14 DIAGNOSIS — K86.2 PANCREATIC CYST: Primary | ICD-10-CM

## 2025-05-14 PROCEDURE — 99999 PR PBB SHADOW E&M-EST. PATIENT-LVL III: CPT | Mod: PBBFAC,,,

## 2025-05-14 PROCEDURE — 99214 OFFICE O/P EST MOD 30 MIN: CPT | Mod: S$GLB,,,

## 2025-05-14 PROCEDURE — 1159F MED LIST DOCD IN RCRD: CPT | Mod: CPTII,S$GLB,,

## 2025-05-14 PROCEDURE — 3008F BODY MASS INDEX DOCD: CPT | Mod: CPTII,S$GLB,,

## 2025-05-14 NOTE — PROGRESS NOTES
Gastroenterology: [facility]      SUBJECTIVE:         Chief Complaint: Here for evaluation of a pancreatic cyst     History of Present Illness:  Patient is a 50 y.o. male with pmhx HIV presents with a pancreatic cyst. The cyst was first noted on recent MRI 4/23/25 for hepatology workup which demonstrated a 4mm nonenhancing cystic focus in the pancreatic head which barely communicates with the pancreatic duct. No pancreatic ductal dilatation. No biliary ductal dilatation.     Patient is asymptomatic at this time. Denies abdominal pain, jaundice, weight loss, NV, early satiety. Denies pmhx of pancreatitis. Family hx significant for a maternal grandpa diagnosed with pancreatic cancer. He does not smoke cigarettes or drink alcohol frequently. Denies hx of diabetes        Prior Imaging:  As per HPI      Personal/family factors:    There is a family history of pancreatic cancer     The patient does not smoke.    ECOG status 0 - Asymptomatic        Review of Systems   Constitutional: no fever, chills or change in weight   Eyes: no visual changes   ENT: no sore throat or dysphagia  Respiratory: no cough or shortness of breath   Cardiovascular: no chest pain or palpitations   Gastrointestinal: as per HPI  Hematologic/Lymphatic: no easy bruising or lymphadenopathy   Musculoskeletal: no arthralgias or myalgias   Neurological: no seizures, tremors or change in mental status  Behavioral/Psych: no auditory or visual hallucinations    Past Medical History:   Diagnosis Date    HIV infection        Past Surgical History:   Procedure Laterality Date    COLONOSCOPY N/A 5/26/2021    Procedure: COLONOSCOPY;  Surgeon: Kaleb Magdaleno MD;  Location: [address];  Service: Endoscopy;  Laterality: N/A;  outside referral from [facility] care for Colonoscopy see media tab records -   cOVID test on 5/23/21 at [facility] -     FLEXIBLE SIGMOIDOSCOPY N/A 2/9/2023    Procedure: SIGMOIDOSCOPY, FLEXIBLE;   "Surgeon: Cody Melgar MD;  Location: [facility] ([address] [place] [address]);  Service: Endoscopy;  Laterality: N/A;    JOINT REPLACEMENT      ankle surgery(L)       No family history on file.    Social History[1]    Medications Ordered Prior to Encounter[2]    Review of patient's allergies indicates:   Allergen Reactions    Sulfamethoxazole Other (See Comments)     sulfamethoxazole    Sulfamethoxazole-trimethoprim Other (See Comments)     "Headaches and sickness"  "Headaches and sickness"      Trimethoprim Other (See Comments)     trimethoprim       Physical Exam:  General: Well-developed, well-appearing, no acute distress  Neuro: alert and oriented to person, place, time; normal appearing gait  Eyes: No scleral icterus, pupils equally round, normal-appearing conjunctiva  Neck: supple; no cervical lymphadenopathy  CVS: regular rate and rhythm; no murmurs  Lungs: clear to auscultation bilaterally; no labored breathing, no wheezes  Abdomen: soft, non-distended, non-tender, no rebound tenderness or guarding, nomal bowel sounds  Extremities: no cyanosis, edema, or clubbing  Skin: no rash, no jaundice        Laboratory:   Lab Results   Component Value Date    ALT 18 04/19/2025    AST 23 04/19/2025    ALKPHOS 98 04/19/2025    BILITOT 0.4 04/19/2025     Lab Results   Component Value Date    WBC 4.32 02/09/2023    HGB 11.8 (L) 02/09/2023    HCT 39.2 (L) 02/09/2023    MCV 84 02/09/2023     02/09/2023     No results found for: "INR", "PROTIME"        Diagnostic/Imaging Results:  As above    ASSESSMENT/PLAN:     Pancreatic cyst in the the head. Measuring 4 mm in size,  unchanged  Most likely etiology at this point is a indeterminate cystic lesion    We discussed in detail the natural history of pancreatic cysts, the therapy involved, and the benefits of multimodality surveillance imaging including Ct, MRI, and EUS with FNA    1. Pancreatic cyst        Plan:     Orders Placed This Encounter    MRI Abdomen W WO Contrast       Pancreas " cyst  4 mm pancreatic cyst found on recent hepatology workup. He is asymptomatic. [facility] pancreatic cyst protocol would recommend MRI in 6 months and then consider lengthening surveillance interval every 1-2 years if no change.  Ordered MRI in October 2025 (6 months from previous). If no change will tentatively plan to lengthen surveillance interval         Hemant Edmond PA-C  Department of Advanced Endoscopy  [facility]         [1]   Social History  Socioeconomic History    Marital status: Single   Tobacco Use    Smoking status: Never    Smokeless tobacco: Never   Substance and Sexual Activity    Alcohol use: Yes     Comment: socially     Drug use: Never   [2]   Current Outpatient Medications on File Prior to Visit   Medication Sig Dispense Refill    acetaminophen (TYLENOL) 500 MG tablet Take 1,000 mg by mouth 2 (two) times daily as needed (headache).      amLODIPine (NORVASC) 10 MG tablet Take 1 tablet by mouth once daily.      mgatutlwi-dvmispek-dvwmnek ala (BIKTARVY) -25 mg (25 kg or greater) Take 1 tablet by mouth once daily.      EScitalopram oxalate (LEXAPRO) 10 MG tablet Take 1 tablet by mouth once daily.      levocetirizine (XYZAL) 5 MG tablet Take 1 tablet by mouth once daily.      penicillin G benzathine (BICILLIN L-A) 2,400,000 unit/4 mL Syrg Inject 4 mL every week by intramuscular route.      oxyCODONE-acetaminophen (PERCOCET) 5-325 mg per tablet Take 1 tablet by mouth every 8 (eight) hours as needed for Pain. (Patient not taking: Reported on 5/14/2025) 10 tablet 0    semaglutide (OZEMPIC) 0.25 mg or 0.5 mg(2 mg/1.5 mL) pen injector Inject 0.5 mg into the skin every Monday. (Patient not taking: Reported on 5/14/2025)       No current facility-administered medications on file prior to visit.

## 2025-06-04 ENCOUNTER — LAB VISIT (OUTPATIENT)
Dept: LAB | Facility: HOSPITAL | Age: 50
End: 2025-06-04
Payer: COMMERCIAL

## 2025-06-04 DIAGNOSIS — B18.2 CHRONIC HEPATITIS C WITHOUT HEPATIC COMA: ICD-10-CM

## 2025-06-04 LAB
ALBUMIN SERPL BCP-MCNC: 3.9 G/DL (ref 3.5–5.2)
ALP SERPL-CCNC: 104 UNIT/L (ref 40–150)
ALT SERPL W/O P-5'-P-CCNC: 23 UNIT/L (ref 10–44)
AST SERPL-CCNC: 27 UNIT/L (ref 11–45)
BILIRUB DIRECT SERPL-MCNC: 0.2 MG/DL (ref 0.1–0.3)
BILIRUB SERPL-MCNC: 0.4 MG/DL (ref 0.1–1)
PROT SERPL-MCNC: 8.4 GM/DL (ref 6–8.4)

## 2025-06-04 PROCEDURE — 36415 COLL VENOUS BLD VENIPUNCTURE: CPT

## 2025-06-04 PROCEDURE — 82040 ASSAY OF SERUM ALBUMIN: CPT

## 2025-06-04 PROCEDURE — 87522 HEPATITIS C REVRS TRNSCRPJ: CPT

## 2025-06-05 ENCOUNTER — RESULTS FOLLOW-UP (OUTPATIENT)
Dept: HEPATOLOGY | Facility: CLINIC | Age: 50
End: 2025-06-05
Payer: COMMERCIAL

## 2025-06-05 DIAGNOSIS — R76.8 HEPATITIS C ANTIBODY TEST POSITIVE: Primary | ICD-10-CM

## 2025-06-05 LAB — HCV RNA SERPL NAA+PROBE-LOG IU: NOT DETECTED {LOG_IU}/ML

## 2025-06-06 ENCOUNTER — TELEPHONE (OUTPATIENT)
Dept: HEPATOLOGY | Facility: CLINIC | Age: 50
End: 2025-06-06
Payer: COMMERCIAL